# Patient Record
Sex: FEMALE | Race: WHITE | NOT HISPANIC OR LATINO | Employment: UNEMPLOYED | URBAN - METROPOLITAN AREA
[De-identification: names, ages, dates, MRNs, and addresses within clinical notes are randomized per-mention and may not be internally consistent; named-entity substitution may affect disease eponyms.]

---

## 2024-04-03 ENCOUNTER — OFFICE VISIT (OUTPATIENT)
Dept: INTERNAL MEDICINE | Facility: CLINIC | Age: 34
End: 2024-04-03
Payer: MEDICAID

## 2024-04-03 ENCOUNTER — HOSPITAL ENCOUNTER (OUTPATIENT)
Dept: GENERAL RADIOLOGY | Facility: HOSPITAL | Age: 34
Discharge: HOME | End: 2024-04-03
Admitting: NURSE PRACTITIONER
Payer: MEDICAID

## 2024-04-03 ENCOUNTER — TRANSCRIBE ORDERS (OUTPATIENT)
Dept: GENERAL RADIOLOGY | Facility: HOSPITAL | Age: 34
End: 2024-04-03
Payer: MEDICAID

## 2024-04-03 VITALS
RESPIRATION RATE: 16 BRPM | DIASTOLIC BLOOD PRESSURE: 76 MMHG | TEMPERATURE: 97.8 F | SYSTOLIC BLOOD PRESSURE: 104 MMHG | HEART RATE: 56 BPM | BODY MASS INDEX: 38.42 KG/M2 | HEIGHT: 55 IN | WEIGHT: 166 LBS

## 2024-04-03 DIAGNOSIS — K21.9 GASTROESOPHAGEAL REFLUX DISEASE, UNSPECIFIED WHETHER ESOPHAGITIS PRESENT: Primary | ICD-10-CM

## 2024-04-03 DIAGNOSIS — Q90.9 DOWN SYNDROME: ICD-10-CM

## 2024-04-03 DIAGNOSIS — F41.9 ANXIETY: ICD-10-CM

## 2024-04-03 DIAGNOSIS — Z82.0 FAMILY HISTORY OF ALZHEIMER'S DISEASE: ICD-10-CM

## 2024-04-03 DIAGNOSIS — R41.3 MEMORY CHANGE: ICD-10-CM

## 2024-04-03 DIAGNOSIS — M08.80 JIA (JUVENILE IDIOPATHIC ARTHRITIS): ICD-10-CM

## 2024-04-03 DIAGNOSIS — K22.2 ESOPHAGEAL STRICTURE: ICD-10-CM

## 2024-04-03 DIAGNOSIS — M08.80 JIA (JUVENILE IDIOPATHIC ARTHRITIS): Primary | ICD-10-CM

## 2024-04-03 DIAGNOSIS — Z78.9 HEALTH MAINTENANCE ALTERATION: ICD-10-CM

## 2024-04-03 LAB
25(OH)D3 SERPL-MCNC: 17.2 NG/ML (ref 30–100)
ALBUMIN SERPL-MCNC: 4.3 G/DL (ref 3.5–5.2)
ALBUMIN/GLOB SERPL: 1.6 G/DL
ALP SERPL-CCNC: 69 U/L (ref 39–117)
ALT SERPL W P-5'-P-CCNC: 17 U/L (ref 1–33)
ANION GAP SERPL CALCULATED.3IONS-SCNC: 10 MMOL/L (ref 5–15)
AST SERPL-CCNC: 23 U/L (ref 1–32)
BASOPHILS # BLD AUTO: 0.03 10*3/MM3 (ref 0–0.2)
BASOPHILS NFR BLD AUTO: 0.8 % (ref 0–1.5)
BILIRUB SERPL-MCNC: 0.3 MG/DL (ref 0–1.2)
BUN SERPL-MCNC: 16 MG/DL (ref 6–20)
BUN/CREAT SERPL: 13.9 (ref 7–25)
CALCIUM SPEC-SCNC: 9.2 MG/DL (ref 8.6–10.5)
CHLORIDE SERPL-SCNC: 104 MMOL/L (ref 98–107)
CHOLEST SERPL-MCNC: 152 MG/DL (ref 0–200)
CO2 SERPL-SCNC: 27 MMOL/L (ref 22–29)
CREAT SERPL-MCNC: 1.15 MG/DL (ref 0.57–1)
DEPRECATED RDW RBC AUTO: 47.1 FL (ref 37–54)
EGFRCR SERPLBLD CKD-EPI 2021: 64.2 ML/MIN/1.73
EOSINOPHIL # BLD AUTO: 0.1 10*3/MM3 (ref 0–0.4)
EOSINOPHIL NFR BLD AUTO: 2.5 % (ref 0.3–6.2)
ERYTHROCYTE [DISTWIDTH] IN BLOOD BY AUTOMATED COUNT: 13.3 % (ref 12.3–15.4)
GLOBULIN UR ELPH-MCNC: 2.7 GM/DL
GLUCOSE SERPL-MCNC: 95 MG/DL (ref 65–99)
HCT VFR BLD AUTO: 40.9 % (ref 34–46.6)
HDLC SERPL-MCNC: 44 MG/DL (ref 40–60)
HGB BLD-MCNC: 13.6 G/DL (ref 12–15.9)
IMM GRANULOCYTES # BLD AUTO: 0.04 10*3/MM3 (ref 0–0.05)
IMM GRANULOCYTES NFR BLD AUTO: 1 % (ref 0–0.5)
LDLC SERPL CALC-MCNC: 88 MG/DL (ref 0–100)
LDLC/HDLC SERPL: 1.95 {RATIO}
LYMPHOCYTES # BLD AUTO: 1.4 10*3/MM3 (ref 0.7–3.1)
LYMPHOCYTES NFR BLD AUTO: 35.4 % (ref 19.6–45.3)
MCH RBC QN AUTO: 32 PG (ref 26.6–33)
MCHC RBC AUTO-ENTMCNC: 33.3 G/DL (ref 31.5–35.7)
MCV RBC AUTO: 96.2 FL (ref 79–97)
MONOCYTES # BLD AUTO: 0.34 10*3/MM3 (ref 0.1–0.9)
MONOCYTES NFR BLD AUTO: 8.6 % (ref 5–12)
NEUTROPHILS NFR BLD AUTO: 2.05 10*3/MM3 (ref 1.7–7)
NEUTROPHILS NFR BLD AUTO: 51.7 % (ref 42.7–76)
NRBC BLD AUTO-RTO: 0 /100 WBC (ref 0–0.2)
PLATELET # BLD AUTO: 231 10*3/MM3 (ref 140–450)
PMV BLD AUTO: 11.6 FL (ref 6–12)
POTASSIUM SERPL-SCNC: 4.3 MMOL/L (ref 3.5–5.2)
PROT SERPL-MCNC: 7 G/DL (ref 6–8.5)
RBC # BLD AUTO: 4.25 10*6/MM3 (ref 3.77–5.28)
SODIUM SERPL-SCNC: 141 MMOL/L (ref 136–145)
TRIGL SERPL-MCNC: 110 MG/DL (ref 0–150)
TSH SERPL DL<=0.05 MIU/L-ACNC: 1.21 UIU/ML (ref 0.27–4.2)
VLDLC SERPL-MCNC: 20 MG/DL (ref 5–40)
WBC NRBC COR # BLD AUTO: 3.96 10*3/MM3 (ref 3.4–10.8)

## 2024-04-03 PROCEDURE — 72040 X-RAY EXAM NECK SPINE 2-3 VW: CPT

## 2024-04-03 PROCEDURE — 72070 X-RAY EXAM THORAC SPINE 2VWS: CPT

## 2024-04-03 PROCEDURE — 80061 LIPID PANEL: CPT | Performed by: PHYSICIAN ASSISTANT

## 2024-04-03 PROCEDURE — 85025 COMPLETE CBC W/AUTO DIFF WBC: CPT | Performed by: PHYSICIAN ASSISTANT

## 2024-04-03 PROCEDURE — 73130 X-RAY EXAM OF HAND: CPT

## 2024-04-03 PROCEDURE — 72100 X-RAY EXAM L-S SPINE 2/3 VWS: CPT

## 2024-04-03 PROCEDURE — 73630 X-RAY EXAM OF FOOT: CPT

## 2024-04-03 PROCEDURE — 84443 ASSAY THYROID STIM HORMONE: CPT | Performed by: PHYSICIAN ASSISTANT

## 2024-04-03 PROCEDURE — 82306 VITAMIN D 25 HYDROXY: CPT | Performed by: PHYSICIAN ASSISTANT

## 2024-04-03 PROCEDURE — 80053 COMPREHEN METABOLIC PANEL: CPT | Performed by: PHYSICIAN ASSISTANT

## 2024-04-03 RX ORDER — MEDROXYPROGESTERONE ACETATE 150 MG/ML
50 INJECTION, SUSPENSION INTRAMUSCULAR
COMMUNITY
Start: 2024-03-27

## 2024-04-03 RX ORDER — PANTOPRAZOLE SODIUM 40 MG/1
40 TABLET, DELAYED RELEASE ORAL DAILY
Qty: 30 TABLET | Refills: 3 | Status: SHIPPED | OUTPATIENT
Start: 2024-04-03

## 2024-04-03 NOTE — PROGRESS NOTES
"    Office Note     Name: Hilda Herrera    : 1990     MRN: [id number]     Chief Complaint  New Patient  and Heartburn    Subjective     History of Present Illness:  Hilda Herrera is a 33 y.o. female who presents today with her mother to establish care.    The patient has concerns regarding of heartburn and reflux. At this time she does have a significant history of esophageal strictures that she has \"stretched\" by Dr. Ovalle in the past, but has not had that done since , would like to reestablish with Dr. Ovalle to possibly have that done. She is currently taking Nexium as needed for GERD, but is interested in changing. Patient has no other concerns at this time. She has a significant past medical history of down syndrome. She does have a history of having a heart murmur and a valve replacement at 15 months old. Mother does report that patient has significant family history of Alzheimer's. Father and paternal grandmother have Alzheimer's. Mother reports lately patient seems to be more forgetful. She is unsure if this is due to anxiety and stress and she is becoming frustrated or if she is actually becoming more forgetful. They have noticed this at her workplace and her sister has noticed as well.    The patient has a significant family history of Alzheimer's, with both father and paternal grandmother having Alzheimer's.    Review of Systems:   Review of Systems    Past Medical History:   Past Medical History:   Diagnosis Date    Arthritis     Down syndrome        Past Surgical History:   Past Surgical History:   Procedure Laterality Date    CARDIAC SURGERY      valve replaced at 15 months old    FOOT SURGERY Right     FOOT SURGERY Right     FOOT SURGERY Right     TONSILLECTOMY         Immunizations:   Immunization History   Administered Date(s) Administered    COVID-19 (MODERNA) 1st,2nd,3rd Dose Monovalent 2021, 2021, 2021    Fluzone (or Fluarix & Flulaval for VFC) >6mos 2022    Tdap " "09/29/2014, 05/18/2018        Medications:     Current Outpatient Medications:     Acetaminophen-Caff-Pyrilamine 500-60-15 MG tablet, As Needed., Disp: , Rfl:     Enbrel SureClick 50 MG/ML solution auto-injector, Inject 50 mg under the skin into the appropriate area as directed Every 7 (Seven) Days., Disp: , Rfl:     pantoprazole (Protonix) 40 MG EC tablet, Take 1 tablet by mouth Daily., Disp: 30 tablet, Rfl: 3    Allergies:   Allergies   Allergen Reactions    Penicillins Rash    Cephalexin Rash and Unknown (See Comments)    Codeine Nausea And Vomiting and Unknown (See Comments)       Family History: History reviewed. No pertinent family history.    Social History:   Social History     Socioeconomic History    Marital status: Single   Tobacco Use    Smoking status: Never     Passive exposure: Never    Smokeless tobacco: Never   Vaping Use    Vaping status: Never Used   Substance and Sexual Activity    Alcohol use: Never    Drug use: Never    Sexual activity: Defer         Objective     Vital Signs  /76 (BP Location: Right arm, Patient Position: Sitting, Cuff Size: Adult)   Pulse 56   Temp 97.8 °F (36.6 °C) (Infrared)   Resp 16   Ht 139.7 cm (55\")   Wt 75.3 kg (166 lb)   BMI 38.58 kg/m²   Estimated body mass index is 38.58 kg/m² as calculated from the following:    Height as of this encounter: 139.7 cm (55\").    Weight as of this encounter: 75.3 kg (166 lb).    Class 2 Severe Obesity (BMI >=35 and <=39.9). Obesity-related health conditions include the following: none. Obesity is unchanged. BMI is is above average; BMI management plan is completed. We discussed portion control and increasing exercise.      Physical Exam  Vitals and nursing note reviewed.   Constitutional:       Appearance: Normal appearance.   Cardiovascular:      Rate and Rhythm: Normal rate and regular rhythm.      Pulses: Normal pulses.      Heart sounds: Normal heart sounds. Murmur (very mild systolic ejection murmur) heard. "   Pulmonary:      Effort: Pulmonary effort is normal.      Breath sounds: Normal breath sounds.   Skin:     General: Skin is warm and dry.   Neurological:      General: No focal deficit present.      Mental Status: She is alert.   Psychiatric:         Mood and Affect: Mood normal.         Behavior: Behavior normal.          Assessment and Plan     1. Gastroesophageal reflux disease, unspecified whether esophagitis present    - pantoprazole (Protonix) 40 MG EC tablet; Take 1 tablet by mouth Daily.  Dispense: 30 tablet; Refill: 3  - Comprehensive Metabolic Panel; Future  - CBC & Differential; Future  - Lipid Panel; Future  - Vitamin D,25-Hydroxy; Future  - Ambulatory Referral to Gastroenterology  - TSH Rfx On Abnormal To Free T4; Future  - Comprehensive Metabolic Panel  - CBC & Differential  - Lipid Panel  - Vitamin D,25-Hydroxy  - TSH Rfx On Abnormal To Free T4    2. Health maintenance alteration    - Comprehensive Metabolic Panel; Future  - CBC & Differential; Future  - Lipid Panel; Future  - Vitamin D,25-Hydroxy; Future  - TSH Rfx On Abnormal To Free T4; Future  - Comprehensive Metabolic Panel  - CBC & Differential  - Lipid Panel  - Vitamin D,25-Hydroxy  - TSH Rfx On Abnormal To Free T4    3. Down syndrome    - Comprehensive Metabolic Panel; Future  - CBC & Differential; Future  - Lipid Panel; Future  - Vitamin D,25-Hydroxy; Future  - TSH Rfx On Abnormal To Free T4; Future  - Comprehensive Metabolic Panel  - CBC & Differential  - Lipid Panel  - Vitamin D,25-Hydroxy  - TSH Rfx On Abnormal To Free T4  - Ambulatory Referral to Neurology    4. Esophageal stricture    - Ambulatory Referral to Gastroenterology    5. Anxiety      6. Family history of Alzheimer's disease    - Ambulatory Referral to Neurology    7. Memory change    - Ambulatory Referral to Neurology       Reviewed medications, potential side effects and signs and symptoms to report. Discussed risk versus benefits of treatment plan with patient and/or  family-including medications, labs and radiology that may be ordered. Addressed questions and concerns during visit. Patient and/or family verbalized understanding and agree with plan. Instructed to call the office with any questions and report to ER with any life-threatening symptoms.     Please cc results to Dianna CANO  rheum  Follow Up  No follow-ups on file.    CHELSEA AguiarE [facility]  [address]  [facility] [address] [phone]    Transcribed from ambient dictation for Jordana Steward PA-C by Michelle Harrington.  04/03/24   15:35 EDT    Patient or patient representative verbalized consent to the visit recording.  I have personally performed the services described in this document as transcribed by the above individual, and it is both accurate and complete.

## 2024-04-04 PROBLEM — R41.3 MEMORY CHANGE: Status: ACTIVE | Noted: 2024-04-04

## 2024-04-05 DIAGNOSIS — R79.89 ELEVATED SERUM CREATININE: ICD-10-CM

## 2024-04-05 DIAGNOSIS — E55.9 VITAMIN D DEFICIENCY: Primary | ICD-10-CM

## 2024-04-05 RX ORDER — ERGOCALCIFEROL 1.25 MG/1
50000 CAPSULE ORAL WEEKLY
Qty: 8 CAPSULE | Refills: 0 | Status: SHIPPED | OUTPATIENT
Start: 2024-04-05

## 2024-05-02 ENCOUNTER — PATIENT MESSAGE (OUTPATIENT)
Dept: INTERNAL MEDICINE | Facility: CLINIC | Age: 34
End: 2024-05-02
Payer: MEDICAID

## 2024-05-03 ENCOUNTER — LAB (OUTPATIENT)
Dept: INTERNAL MEDICINE | Facility: CLINIC | Age: 34
End: 2024-05-03
Payer: MEDICAID

## 2024-05-03 DIAGNOSIS — R79.89 ELEVATED SERUM CREATININE: ICD-10-CM

## 2024-05-03 LAB
ALBUMIN SERPL-MCNC: 4 G/DL (ref 3.5–5.2)
ANION GAP SERPL CALCULATED.3IONS-SCNC: 10.2 MMOL/L (ref 5–15)
BUN SERPL-MCNC: 14 MG/DL (ref 6–20)
BUN/CREAT SERPL: 17.3 (ref 7–25)
CALCIUM SPEC-SCNC: 9 MG/DL (ref 8.6–10.5)
CHLORIDE SERPL-SCNC: 105 MMOL/L (ref 98–107)
CO2 SERPL-SCNC: 25.8 MMOL/L (ref 22–29)
CREAT SERPL-MCNC: 0.81 MG/DL (ref 0.57–1)
EGFRCR SERPLBLD CKD-EPI 2021: 97.8 ML/MIN/1.73
GLUCOSE SERPL-MCNC: 80 MG/DL (ref 65–99)
PHOSPHATE SERPL-MCNC: 4.1 MG/DL (ref 2.5–4.5)
POTASSIUM SERPL-SCNC: 3.9 MMOL/L (ref 3.5–5.2)
SODIUM SERPL-SCNC: 141 MMOL/L (ref 136–145)

## 2024-05-03 PROCEDURE — 80069 RENAL FUNCTION PANEL: CPT | Performed by: PHYSICIAN ASSISTANT

## 2024-07-11 ENCOUNTER — OFFICE VISIT (OUTPATIENT)
Dept: INTERNAL MEDICINE | Facility: CLINIC | Age: 34
End: 2024-07-11
Payer: MEDICAID

## 2024-07-11 VITALS
TEMPERATURE: 98.3 F | SYSTOLIC BLOOD PRESSURE: 120 MMHG | BODY MASS INDEX: 35.41 KG/M2 | RESPIRATION RATE: 18 BRPM | DIASTOLIC BLOOD PRESSURE: 80 MMHG | WEIGHT: 153 LBS | HEIGHT: 55 IN

## 2024-07-11 DIAGNOSIS — R05.1 ACUTE COUGH: ICD-10-CM

## 2024-07-11 DIAGNOSIS — J01.40 ACUTE NON-RECURRENT PANSINUSITIS: Primary | ICD-10-CM

## 2024-07-11 LAB
EXPIRATION DATE: NORMAL
FLUAV AG UPPER RESP QL IA.RAPID: NOT DETECTED
FLUBV AG UPPER RESP QL IA.RAPID: NOT DETECTED
INTERNAL CONTROL: NORMAL
Lab: NORMAL
SARS-COV-2 AG UPPER RESP QL IA.RAPID: NOT DETECTED

## 2024-07-11 PROCEDURE — 1159F MED LIST DOCD IN RCRD: CPT | Performed by: NURSE PRACTITIONER

## 2024-07-11 PROCEDURE — 87428 SARSCOV & INF VIR A&B AG IA: CPT | Performed by: NURSE PRACTITIONER

## 2024-07-11 PROCEDURE — 1160F RVW MEDS BY RX/DR IN RCRD: CPT | Performed by: NURSE PRACTITIONER

## 2024-07-11 PROCEDURE — 99213 OFFICE O/P EST LOW 20 MIN: CPT | Performed by: NURSE PRACTITIONER

## 2024-07-11 RX ORDER — AZITHROMYCIN 250 MG/1
TABLET, FILM COATED ORAL
Qty: 6 TABLET | Refills: 0 | Status: SHIPPED | OUTPATIENT
Start: 2024-07-11

## 2024-07-11 NOTE — PROGRESS NOTES
"Patient Name: Hilda Herrera  : 1990   MRN: [id number]     Chief Complaint:    Chief Complaint   Patient presents with    Cough    Earache       History of Present Illness: Hilda Herrera is a 33 y.o. female.  History of Present Illness  The patient presents for evaluation of multiple medical concerns. She is accompanied by her mother.    The patient reports experiencing fluid accumulation in her right ear, accompanied by a persistent cough and significant drainage. She denies the presence of fever, shortness of breath, or sore throat. She also denies any facial pain, nausea, vomiting, or diarrhea.         Subjective     Review of System: Review of Systems     Medications:     Current Outpatient Medications:     Acetaminophen-Caff-Pyrilamine 500-60-15 MG tablet, As Needed., Disp: , Rfl:     Enbrel SureClick 50 MG/ML solution auto-injector, Inject 50 mg under the skin into the appropriate area as directed Every 7 (Seven) Days., Disp: , Rfl:     pantoprazole (Protonix) 40 MG EC tablet, Take 1 tablet by mouth Daily., Disp: 30 tablet, Rfl: 3    azithromycin (Zithromax Z-Joshua) 250 MG tablet, Take 2 tablets by mouth on day 1, then 1 tablet daily on days 2-5, Disp: 6 tablet, Rfl: 0    vitamin D (ERGOCALCIFEROL) 1.25 MG (98126 UT) capsule capsule, Take 1 capsule by mouth 1 (One) Time Per Week., Disp: 8 capsule, Rfl: 0    Allergies:   Allergies   Allergen Reactions    Penicillins Rash    Cephalexin Rash and Unknown (See Comments)    Codeine Nausea And Vomiting and Unknown (See Comments)       Objective     Physical Exam:   Vital Signs:   Vitals:    24 1109   BP: 120/80   Resp: 18   Temp: 98.3 °F (36.8 °C)   Weight: 69.4 kg (153 lb)   Height: 139.7 cm (55\")     Body mass index is 35.56 kg/m².        Physical Exam  Physical Exam  Patient appears well.  Pharynx is slightly erythematous with postnasal drainage. TMs are normal.  No cervical adenopathy.  Lungs are clear.  Heart rate is regular.  Abdomen is soft, nontender. " Active bowel sounds.  Gait is normal.       Results  Laboratory Studies  Covid test was negative.     Assessment / Plan      Assessment/Plan:   Diagnoses and all orders for this visit:    1. Acute non-recurrent pansinusitis (Primary)  -     azithromycin (Zithromax Z-Joshua) 250 MG tablet; Take 2 tablets by mouth on day 1, then 1 tablet daily on days 2-5  Dispense: 6 tablet; Refill: 0    2. Acute cough  -     POCT SARS-CoV-2 + Flu Antigen FLAKITA; Future  -     POCT SARS-CoV-2 + Flu Antigen FLAKITA       Assessment & Plan    The patient was informed about the common side effects and recommendations for probiotics. She was advised to complete the 10-day course of antibiotics, and to continue with supportive care and hydration.    Follow-up  The patient is scheduled for a follow-up if there is no improvement in her symptoms or if her condition worsens.         Patient or patient representative verbalized consent for the use of Ambient Listening during the visit with  RACHAEL Piper for chart documentation. 7/11/2024  11:29 EDT    RACHAEL Piper  [facility]

## 2024-07-24 DIAGNOSIS — K21.9 GASTROESOPHAGEAL REFLUX DISEASE, UNSPECIFIED WHETHER ESOPHAGITIS PRESENT: ICD-10-CM

## 2024-07-24 RX ORDER — PANTOPRAZOLE SODIUM 40 MG/1
40 TABLET, DELAYED RELEASE ORAL DAILY
Qty: 30 TABLET | Refills: 0 | Status: SHIPPED | OUTPATIENT
Start: 2024-07-24

## 2024-07-24 NOTE — TELEPHONE ENCOUNTER
LOV 04/03/2024  NOV Not scheduled yet    Please cc results to Dianna CANO  rheum  Follow Up  No follow-ups on file.

## 2024-07-30 DIAGNOSIS — R63.4 WEIGHT LOSS: Primary | ICD-10-CM

## 2024-08-23 DIAGNOSIS — K21.9 GASTROESOPHAGEAL REFLUX DISEASE, UNSPECIFIED WHETHER ESOPHAGITIS PRESENT: ICD-10-CM

## 2024-08-23 RX ORDER — PANTOPRAZOLE SODIUM 40 MG/1
40 TABLET, DELAYED RELEASE ORAL DAILY
Qty: 30 TABLET | Refills: 0 | Status: SHIPPED | OUTPATIENT
Start: 2024-08-23

## 2024-09-04 ENCOUNTER — TELEPHONE (OUTPATIENT)
Dept: INTERNAL MEDICINE | Facility: CLINIC | Age: 34
End: 2024-09-04
Payer: MEDICAID

## 2024-09-04 NOTE — TELEPHONE ENCOUNTER
Spoke with mother re: standing lab orders. Mother stats labs were completed at  and are resulted in chart. Please advise      Mother additionally states will complete stool sample next week

## 2024-09-06 NOTE — TELEPHONE ENCOUNTER
Pt notified of message from ALEXX Aguiar   Most labs are within normal limits.   No anemia noted.     Thyroid labs were within normal limits     Glucose was slightly elevated but may have been if she wasn't fasting      Good understanding verbalized.

## 2024-09-06 NOTE — TELEPHONE ENCOUNTER
Most labs are within normal limits.   No anemia noted.    Thyroid labs were within normal limits    Glucose was slightly elevated but may have been if she wasn't fasting

## 2024-09-18 ENCOUNTER — OFFICE VISIT (OUTPATIENT)
Dept: INTERNAL MEDICINE | Facility: CLINIC | Age: 34
End: 2024-09-18
Payer: MEDICAID

## 2024-09-18 ENCOUNTER — OFFICE VISIT (OUTPATIENT)
Dept: NEUROLOGY | Facility: CLINIC | Age: 34
End: 2024-09-18
Payer: MEDICAID

## 2024-09-18 VITALS
BODY MASS INDEX: 34.66 KG/M2 | OXYGEN SATURATION: 99 % | HEART RATE: 83 BPM | SYSTOLIC BLOOD PRESSURE: 110 MMHG | WEIGHT: 149.13 LBS | DIASTOLIC BLOOD PRESSURE: 70 MMHG | RESPIRATION RATE: 20 BRPM | TEMPERATURE: 97.8 F

## 2024-09-18 VITALS
DIASTOLIC BLOOD PRESSURE: 70 MMHG | WEIGHT: 149 LBS | HEART RATE: 64 BPM | OXYGEN SATURATION: 100 % | SYSTOLIC BLOOD PRESSURE: 106 MMHG | BODY MASS INDEX: 34.48 KG/M2 | HEIGHT: 55 IN

## 2024-09-18 DIAGNOSIS — R41.3 MEMORY CHANGE: ICD-10-CM

## 2024-09-18 DIAGNOSIS — F41.9 ANXIETY: Primary | ICD-10-CM

## 2024-09-18 DIAGNOSIS — F51.05 INSOMNIA DUE TO OTHER MENTAL DISORDER: Primary | ICD-10-CM

## 2024-09-18 DIAGNOSIS — F99 INSOMNIA DUE TO OTHER MENTAL DISORDER: Primary | ICD-10-CM

## 2024-09-18 DIAGNOSIS — Q90.9 DOWN SYNDROME: ICD-10-CM

## 2024-09-18 DIAGNOSIS — Z82.0 FAMILY HISTORY OF ALZHEIMER'S DISEASE: ICD-10-CM

## 2024-09-18 PROCEDURE — 99214 OFFICE O/P EST MOD 30 MIN: CPT | Performed by: PHYSICIAN ASSISTANT

## 2024-09-18 PROCEDURE — 1160F RVW MEDS BY RX/DR IN RCRD: CPT | Performed by: NURSE PRACTITIONER

## 2024-09-18 PROCEDURE — 1159F MED LIST DOCD IN RCRD: CPT | Performed by: NURSE PRACTITIONER

## 2024-09-18 PROCEDURE — 99214 OFFICE O/P EST MOD 30 MIN: CPT | Performed by: NURSE PRACTITIONER

## 2024-09-18 PROCEDURE — 1125F AMNT PAIN NOTED PAIN PRSNT: CPT | Performed by: PHYSICIAN ASSISTANT

## 2024-09-18 RX ORDER — HYDROXYZINE HYDROCHLORIDE 25 MG/1
25 TABLET, FILM COATED ORAL NIGHTLY PRN
Qty: 30 TABLET | Refills: 3 | Status: SHIPPED | OUTPATIENT
Start: 2024-09-18

## 2024-09-18 RX ORDER — ESCITALOPRAM OXALATE 10 MG/1
10 TABLET ORAL DAILY
Qty: 30 TABLET | Refills: 3 | Status: SHIPPED | OUTPATIENT
Start: 2024-09-18 | End: 2025-09-18

## 2024-09-22 DIAGNOSIS — K21.9 GASTROESOPHAGEAL REFLUX DISEASE, UNSPECIFIED WHETHER ESOPHAGITIS PRESENT: ICD-10-CM

## 2024-09-23 RX ORDER — PANTOPRAZOLE SODIUM 40 MG/1
40 TABLET, DELAYED RELEASE ORAL DAILY
Qty: 30 TABLET | Refills: 0 | Status: SHIPPED | OUTPATIENT
Start: 2024-09-23

## 2024-10-21 ENCOUNTER — TELEPHONE (OUTPATIENT)
Dept: NEUROLOGY | Facility: CLINIC | Age: 34
End: 2024-10-21
Payer: MEDICAID

## 2024-10-21 NOTE — TELEPHONE ENCOUNTER
Called Hilda's mother Marycruz to discuss results of the neuropsych eval.  The phone went straight to voicemail and I left a message for her to return the call to the office at her convenience.

## 2024-11-04 RX ORDER — DONEPEZIL HYDROCHLORIDE 5 MG/1
5 TABLET, FILM COATED ORAL NIGHTLY
Qty: 30 TABLET | Refills: 6 | Status: SHIPPED | OUTPATIENT
Start: 2024-11-04 | End: 2025-11-04

## 2024-11-05 ENCOUNTER — TELEPHONE (OUTPATIENT)
Dept: NEUROLOGY | Facility: CLINIC | Age: 34
End: 2024-11-05
Payer: MEDICAID

## 2024-11-05 DIAGNOSIS — R41.3 MEMORY CHANGE: Primary | ICD-10-CM

## 2024-11-05 DIAGNOSIS — Q90.9 DOWN SYNDROME: ICD-10-CM

## 2024-11-05 DIAGNOSIS — Z82.0 FAMILY HISTORY OF ALZHEIMER'S DISEASE: ICD-10-CM

## 2024-11-05 NOTE — TELEPHONE ENCOUNTER
Returned call to Hilda's mom, Marycruz to answer questions regarding neuropsych eval.    Marycruz asks if there are any specific symptoms that she needs to be watching for and we discussed that she will continue to monitor for neurologic symptoms such as forgetfulness, agitation.    Hilda has an appointment with a new PCP and Marycruz was curious as to whether any other blood work needs to be performed at that visit.  In looking back she had thyroid studies done long with a CMP and CBC and August and those were all normal.  She does not need any further lab studies at this time.    We discussed MRI of the brain I do not feel it would be a good idea to go ahead and have her do the scan to get a baseline.  Marycruz is in agreement with this.    I encouraged Marycruz to write down any further questions and we can discuss more at her follow-up visit next week.

## 2024-11-06 ENCOUNTER — OFFICE VISIT (OUTPATIENT)
Dept: INTERNAL MEDICINE | Facility: CLINIC | Age: 34
End: 2024-11-06
Payer: MEDICAID

## 2024-11-06 VITALS
HEART RATE: 74 BPM | DIASTOLIC BLOOD PRESSURE: 74 MMHG | RESPIRATION RATE: 18 BRPM | SYSTOLIC BLOOD PRESSURE: 112 MMHG | WEIGHT: 152 LBS | BODY MASS INDEX: 35.33 KG/M2 | TEMPERATURE: 97.8 F

## 2024-11-06 DIAGNOSIS — Q74.2 CONGENITAL FOOT ABNORMALITY: ICD-10-CM

## 2024-11-06 DIAGNOSIS — Q90.9 DOWN SYNDROME: ICD-10-CM

## 2024-11-06 DIAGNOSIS — K22.2 ESOPHAGEAL STRICTURE: ICD-10-CM

## 2024-11-06 DIAGNOSIS — N93.9 ABNORMAL UTERINE BLEEDING: ICD-10-CM

## 2024-11-06 DIAGNOSIS — F41.9 ANXIETY: ICD-10-CM

## 2024-11-06 DIAGNOSIS — Z00.00 HEALTHCARE MAINTENANCE: Primary | ICD-10-CM

## 2024-11-06 DIAGNOSIS — R41.3 MEMORY LOSS: ICD-10-CM

## 2024-11-06 PROBLEM — Z82.0 FAMILY HISTORY OF ALZHEIMER'S DISEASE: Status: RESOLVED | Noted: 2024-04-03 | Resolved: 2024-11-06

## 2024-11-06 PROBLEM — Z71.1 CONCERN ABOUT MEMORY: Status: ACTIVE | Noted: 2024-04-04

## 2024-11-06 LAB
ALBUMIN SERPL-MCNC: 3.9 G/DL (ref 3.5–5.2)
ALBUMIN/GLOB SERPL: 1.5 G/DL
ALP SERPL-CCNC: 61 U/L (ref 39–117)
ALT SERPL W P-5'-P-CCNC: 11 U/L (ref 1–33)
ANION GAP SERPL CALCULATED.3IONS-SCNC: 9.9 MMOL/L (ref 5–15)
AST SERPL-CCNC: 26 U/L (ref 1–32)
BILIRUB SERPL-MCNC: <0.2 MG/DL (ref 0–1.2)
BUN SERPL-MCNC: 13 MG/DL (ref 6–20)
BUN/CREAT SERPL: 14.3 (ref 7–25)
CALCIUM SPEC-SCNC: 8.5 MG/DL (ref 8.6–10.5)
CHLORIDE SERPL-SCNC: 105 MMOL/L (ref 98–107)
CHOLEST SERPL-MCNC: 156 MG/DL (ref 0–200)
CO2 SERPL-SCNC: 26.1 MMOL/L (ref 22–29)
CREAT SERPL-MCNC: 0.91 MG/DL (ref 0.57–1)
EGFRCR SERPLBLD CKD-EPI 2021: 85.1 ML/MIN/1.73
EXPIRATION DATE: NORMAL
FSH SERPL-ACNC: 9.26 MIU/ML
GLOBULIN UR ELPH-MCNC: 2.6 GM/DL
GLUCOSE SERPL-MCNC: 77 MG/DL (ref 65–99)
HBA1C MFR BLD: 5.2 % (ref 4.5–5.7)
HDLC SERPL-MCNC: 47 MG/DL (ref 40–60)
LDLC SERPL CALC-MCNC: 95 MG/DL (ref 0–100)
LDLC/HDLC SERPL: 2.02 {RATIO}
LH SERPL-ACNC: 25.2 MIU/ML
Lab: NORMAL
POTASSIUM SERPL-SCNC: 4 MMOL/L (ref 3.5–5.2)
PROLACTIN SERPL-MCNC: 15.2 NG/ML (ref 4.79–23.3)
PROT SERPL-MCNC: 6.5 G/DL (ref 6–8.5)
SODIUM SERPL-SCNC: 141 MMOL/L (ref 136–145)
TRIGL SERPL-MCNC: 71 MG/DL (ref 0–150)
TSH SERPL DL<=0.05 MIU/L-ACNC: 0.98 UIU/ML (ref 0.27–4.2)
VLDLC SERPL-MCNC: 14 MG/DL (ref 5–40)

## 2024-11-06 PROCEDURE — 82626 DEHYDROEPIANDROSTERONE: CPT | Performed by: STUDENT IN AN ORGANIZED HEALTH CARE EDUCATION/TRAINING PROGRAM

## 2024-11-06 PROCEDURE — 84146 ASSAY OF PROLACTIN: CPT | Performed by: STUDENT IN AN ORGANIZED HEALTH CARE EDUCATION/TRAINING PROGRAM

## 2024-11-06 PROCEDURE — 84402 ASSAY OF FREE TESTOSTERONE: CPT | Performed by: STUDENT IN AN ORGANIZED HEALTH CARE EDUCATION/TRAINING PROGRAM

## 2024-11-06 PROCEDURE — 83002 ASSAY OF GONADOTROPIN (LH): CPT | Performed by: STUDENT IN AN ORGANIZED HEALTH CARE EDUCATION/TRAINING PROGRAM

## 2024-11-06 PROCEDURE — 83525 ASSAY OF INSULIN: CPT | Performed by: STUDENT IN AN ORGANIZED HEALTH CARE EDUCATION/TRAINING PROGRAM

## 2024-11-06 PROCEDURE — 84443 ASSAY THYROID STIM HORMONE: CPT | Performed by: STUDENT IN AN ORGANIZED HEALTH CARE EDUCATION/TRAINING PROGRAM

## 2024-11-06 PROCEDURE — 82397 CHEMILUMINESCENT ASSAY: CPT | Performed by: STUDENT IN AN ORGANIZED HEALTH CARE EDUCATION/TRAINING PROGRAM

## 2024-11-06 PROCEDURE — 82627 DEHYDROEPIANDROSTERONE: CPT | Performed by: STUDENT IN AN ORGANIZED HEALTH CARE EDUCATION/TRAINING PROGRAM

## 2024-11-06 PROCEDURE — 83498 ASY HYDROXYPROGESTERONE 17-D: CPT | Performed by: STUDENT IN AN ORGANIZED HEALTH CARE EDUCATION/TRAINING PROGRAM

## 2024-11-06 PROCEDURE — 85025 COMPLETE CBC W/AUTO DIFF WBC: CPT | Performed by: STUDENT IN AN ORGANIZED HEALTH CARE EDUCATION/TRAINING PROGRAM

## 2024-11-06 PROCEDURE — 82672 ASSAY OF ESTROGEN: CPT | Performed by: STUDENT IN AN ORGANIZED HEALTH CARE EDUCATION/TRAINING PROGRAM

## 2024-11-06 PROCEDURE — 80061 LIPID PANEL: CPT | Performed by: STUDENT IN AN ORGANIZED HEALTH CARE EDUCATION/TRAINING PROGRAM

## 2024-11-06 PROCEDURE — 80053 COMPREHEN METABOLIC PANEL: CPT | Performed by: STUDENT IN AN ORGANIZED HEALTH CARE EDUCATION/TRAINING PROGRAM

## 2024-11-06 PROCEDURE — 83001 ASSAY OF GONADOTROPIN (FSH): CPT | Performed by: STUDENT IN AN ORGANIZED HEALTH CARE EDUCATION/TRAINING PROGRAM

## 2024-11-06 NOTE — PATIENT INSTRUCTIONS
Health Maintenance, Female  Adopting a healthy lifestyle and getting preventive care can go a long way to promote health and wellness. Talk with your health care provider about what schedule of regular examinations is right for you. This is a good chance for you to check in with your provider about disease prevention and staying healthy.  In between checkups, there are plenty of things you can do on your own. Experts have done a lot of research about which lifestyle changes and preventive measures are most likely to keep you healthy. Ask your health care provider for more information.  Weight and diet  Eat a healthy diet  Be sure to include plenty of vegetables, fruits, low-fat dairy products, and lean protein.  Do not eat a lot of foods high in solid fats, added sugars, or salt.  Get regular exercise. This is one of the most important things you can do for your health.  Most adults should exercise for at least 150 minutes each week. The exercise should increase your heart rate and make you sweat (moderate-intensity exercise).  Most adults should also do strengthening exercises at least twice a week. This is in addition to the moderate-intensity exercise.     Maintain a healthy weight  Body mass index (BMI) is a measurement that can be used to identify possible weight problems. It estimates body fat based on height and weight. Your health care provider can help determine your BMI and help you achieve or maintain a healthy weight.  For females 20 years of age and older:  A BMI below 18.5 is considered underweight.  A BMI of 18.5 to 24.9 is normal.  A BMI of 25 to 29.9 is considered overweight.  A BMI of 30 and above is considered obese.     Watch levels of cholesterol and blood lipids  You should start having your blood tested for lipids and cholesterol at 20 years of age, then have this test every 5 years.  You may need to have your cholesterol levels checked more often if:  Your lipid or cholesterol levels are  high.  You are older than 50 years of age.  You are at high risk for heart disease.     Cancer screening  Lung Cancer  Lung cancer screening is recommended for adults 55-80 years old who are at high risk for lung cancer because of a history of smoking.  A yearly low-dose CT scan of the lungs is recommended for people who:  Currently smoke.  Have quit within the past 15 years.  Have at least a 30-pack-year history of smoking. A pack year is smoking an average of one pack of cigarettes a day for 1 year.  Yearly screening should continue until it has been 15 years since you quit.  Yearly screening should stop if you develop a health problem that would prevent you from having lung cancer treatment.     Breast Cancer  Practice breast self-awareness. This means understanding how your breasts normally appear and feel.  It also means doing regular breast self-exams. Let your health care provider know about any changes, no matter how small.  If you are in your 20s or 30s, you should have a clinical breast exam (CBE) by a health care provider every 1-3 years as part of a regular health exam.  If you are 40 or older, have a CBE every year. Also consider having a breast X-ray (mammogram) every year.  If you have a family history of breast cancer, talk to your health care provider about genetic screening.  If you are at high risk for breast cancer, talk to your health care provider about having an MRI and a mammogram every year.  Breast cancer gene (BRCA) assessment is recommended for women who have family members with BRCA-related cancers. BRCA-related cancers include:  Breast.  Ovarian.  Tubal.  Peritoneal cancers.  Results of the assessment will determine the need for genetic counseling and BRCA1 and BRCA2 testing.     Cervical Cancer  Your health care provider may recommend that you be screened regularly for cancer of the pelvic organs (ovaries, uterus, and vagina). This screening involves a pelvic examination, including  checking for microscopic changes to the surface of your cervix (Pap test). You may be encouraged to have this screening done every 3 years, beginning at age 21.  For women ages 30-65, health care providers may recommend pelvic exams and Pap testing every 3 years, or they may recommend the Pap and pelvic exam, combined with testing for human papilloma virus (HPV), every 5 years. Some types of HPV increase your risk of cervical cancer. Testing for HPV may also be done on women of any age with unclear Pap test results.  Other health care providers may not recommend any screening for nonpregnant women who are considered low risk for pelvic cancer and who do not have symptoms. Ask your health care provider if a screening pelvic exam is right for you.  If you have had past treatment for cervical cancer or a condition that could lead to cancer, you need Pap tests and screening for cancer for at least 20 years after your treatment. If Pap tests have been discontinued, your risk factors (such as having a new sexual partner) need to be reassessed to determine if screening should resume. Some women have medical problems that increase the chance of getting cervical cancer. In these cases, your health care provider may recommend more frequent screening and Pap tests.     Colorectal Cancer  This type of cancer can be detected and often prevented.  Routine colorectal cancer screening usually begins at 50 years of age and continues through 75 years of age.  Your health care provider may recommend screening at an earlier age if you have risk factors for colon cancer.  Your health care provider may also recommend using home test kits to check for hidden blood in the stool.  A small camera at the end of a tube can be used to examine your colon directly (sigmoidoscopy or colonoscopy). This is done to check for the earliest forms of colorectal cancer.  Routine screening usually begins at age 50.  Direct examination of the colon should  be repeated every 5-10 years through 75 years of age. However, you may need to be screened more often if early forms of precancerous polyps or small growths are found.     Skin Cancer  Check your skin from head to toe regularly.  Tell your health care provider about any new moles or changes in moles, especially if there is a change in a mole's shape or color.  Also tell your health care provider if you have a mole that is larger than the size of a pencil eraser.  Always use sunscreen. Apply sunscreen liberally and repeatedly throughout the day.  Protect yourself by wearing long sleeves, pants, a wide-brimmed hat, and sunglasses whenever you are outside.     Heart disease, diabetes, and high blood pressure  High blood pressure causes heart disease and increases the risk of stroke. High blood pressure is more likely to develop in:  People who have blood pressure in the high end of the normal range (130-139/85-89 mm Hg).  People who are overweight or obese.  People who are .  If you are 18-39 years of age, have your blood pressure checked every 3-5 years. If you are 40 years of age or older, have your blood pressure checked every year. You should have your blood pressure measured twice--once when you are at a hospital or clinic, and once when you are not at a hospital or clinic. Record the average of the two measurements. To check your blood pressure when you are not at a hospital or clinic, you can use:  An automated blood pressure machine at a pharmacy.  A home blood pressure monitor.  If you are between 55 years and 79 years old, ask your health care provider if you should take aspirin to prevent strokes.  Have regular diabetes screenings. This involves taking a blood sample to check your fasting blood sugar level.  If you are at a normal weight and have a low risk for diabetes, have this test once every three years after 45 years of age.  If you are overweight and have a high risk for diabetes,  consider being tested at a younger age or more often.  Preventing infection  Hepatitis B  If you have a higher risk for hepatitis B, you should be screened for this virus. You are considered at high risk for hepatitis B if:  You were born in a country where hepatitis B is common. Ask your health care provider which countries are considered high risk.  Your parents were born in a high-risk country, and you have not been immunized against hepatitis B (hepatitis B vaccine).  You have HIV or AIDS.  You use needles to inject street drugs.  You live with someone who has hepatitis B.  You have had sex with someone who has hepatitis B.  You get hemodialysis treatment.  You take certain medicines for conditions, including cancer, organ transplantation, and autoimmune conditions.     Hepatitis C  Blood testing is recommended for:  Everyone born from 1945 through 1965.  Anyone with known risk factors for hepatitis C.     Sexually transmitted infections (STIs)  You should be screened for sexually transmitted infections (STIs) including gonorrhea and chlamydia if:  You are sexually active and are younger than 24 years of age.  You are older than 24 years of age and your health care provider tells you that you are at risk for this type of infection.  Your sexual activity has changed since you were last screened and you are at an increased risk for chlamydia or gonorrhea. Ask your health care provider if you are at risk.  If you do not have HIV, but are at risk, it may be recommended that you take a prescription medicine daily to prevent HIV infection. This is called pre-exposure prophylaxis (PrEP). You are considered at risk if:  You are sexually active and do not regularly use condoms or know the HIV status of your partner(s).  You take drugs by injection.  You are sexually active with a partner who has HIV.     Talk with your health care provider about whether you are at high risk of being infected with HIV. If you choose to  begin PrEP, you should first be tested for HIV. You should then be tested every 3 months for as long as you are taking PrEP.  Pregnancy  If you are premenopausal and you may become pregnant, ask your health care provider about preconception counseling.  If you may become pregnant, take 400 to 800 micrograms (mcg) of folic acid every day.  If you want to prevent pregnancy, talk to your health care provider about birth control (contraception).  Osteoporosis and menopause  Osteoporosis is a disease in which the bones lose minerals and strength with aging. This can result in serious bone fractures. Your risk for osteoporosis can be identified using a bone density scan.  If you are 65 years of age or older, or if you are at risk for osteoporosis and fractures, ask your health care provider if you should be screened.  Ask your health care provider whether you should take a calcium or vitamin D supplement to lower your risk for osteoporosis.  Menopause may have certain physical symptoms and risks.  Hormone replacement therapy may reduce some of these symptoms and risks.  Talk to your health care provider about whether hormone replacement therapy is right for you.  Follow these instructions at home:  Schedule regular health, dental, and eye exams.  Stay current with your immunizations.  Do not use any tobacco products including cigarettes, chewing tobacco, or electronic cigarettes.  If you are pregnant, do not drink alcohol.  If you are breastfeeding, limit how much and how often you drink alcohol.  Limit alcohol intake to no more than 1 drink per day for nonpregnant women. One drink equals 12 ounces of beer, 5 ounces of wine, or 1½ ounces of hard liquor.  Do not use street drugs.  Do not share needles.  Ask your health care provider for help if you need support or information about quitting drugs.  Tell your health care provider if you often feel depressed.  Tell your health care provider if you have ever been abused or do  not feel safe at home.  This information is not intended to replace advice given to you by your health care provider. Make sure you discuss any questions you have with your health care provider.  Document Released: 07/02/2012 Document Revised: 05/25/2017 Document Reviewed: 09/20/2016  [employer] Interactive Patient Education © 2018 [employer]         Healthy Delicious Recipes from [employer]: https://Shodogg.[employer]/  [place] Plant Based Meal Recipes: https://Provasculon/  Heart Healthy Recipes from [employer]: https://abcardio.org/wp-content/uploads/2020/06/ABC_Cookbook.pdf  [place] Healthy Living Guide: https://www.\[facility]\""h.[employer].edu/nutritionsource/2022/01/06/healthy-living-guide-5778-9110/  SNAP Cookbook for Budgets: http://ongoMobile Event Guide.net/dss/documents/good-and-cheap.pdf

## 2024-11-06 NOTE — PROGRESS NOTES
Female Physical Note      Date: 2024   Patient Name: Hilda Herrera  : 1990   MRN: [id number]     Chief Complaint:    Chief Complaint   Patient presents with   • Establish Care       History of Present Illness: Hilda Herrera is a 33 y.o. female who is here today for their annual health maintenance and physical.  Her mother is an independent historian.  History of Present Illness  The patient presents for evaluation of multiple medical concerns. She is accompanied by her mother.    She reports difficulty sleeping at night, often waking up due to panic attacks. These episodes have been more frequent since starting Lexapro. Despite this, she believes the medication has improved her sleep quality. She also experiences separation anxiety and uses a night light to aid her sleep. Teeth grinding is another issue she faces. She has been cleared by a cardiologist and has no issues with needles. She is scheduled to start donepezil on Friday.    She has a history of esophageal stricture, which requires stretching every two years. The last procedure was performed this year. She occasionally struggles with swallowing and sometimes chokes on water. She has not undergone occupational therapy.    She has not had a Pap smear and has not menstruated in four months. She has a dentist but had to cancel her last appointment. She has noticed an improvement in her bite but still experiences difficulty eating.    She has undergone three surgeries on her foot, which now contains hardware and is curved.    FAMILY HISTORY  She has a family history of Alzheimer's.         Subjective      Review of Systems:   Review of Systems   All other systems reviewed and are negative.      Past Medical History, Social History, Family History and Care Team were all reviewed with patient and updated as appropriate.     Medications:     Current Outpatient Medications:   •  Acetaminophen-Caff-Pyrilamine 500-60-15 MG tablet, As Needed., Disp: , Rfl:    •  donepezil (Aricept) 5 MG tablet, Take 1 tablet by mouth Every Night., Disp: 30 tablet, Rfl: 6  •  Enbrel SureClick 50 MG/ML solution auto-injector, Inject 50 mg under the skin into the appropriate area as directed Every 7 (Seven) Days., Disp: , Rfl:   •  escitalopram (Lexapro) 10 MG tablet, Take 1 tablet by mouth Daily., Disp: 30 tablet, Rfl: 3  •  hydrOXYzine (ATARAX) 25 MG tablet, Take 1 tablet by mouth At Night As Needed for Anxiety (insomnia)., Disp: 30 tablet, Rfl: 3  •  pantoprazole (PROTONIX) 40 MG EC tablet, Take 1 tablet by mouth once daily, Disp: 30 tablet, Rfl: 0    Allergies:   Allergies   Allergen Reactions   • Penicillins Rash   • Cephalexin Rash and Unknown (See Comments)   • Codeine Nausea And Vomiting and Unknown (See Comments)       Immunizations:  Immunization History   Administered Date(s) Administered   • COVID-19 (MODERNA) 1st,2nd,3rd Dose Monovalent 01/13/2021, 02/09/2021   • COVID-19 (MODERNA) Monovalent Original Booster 12/07/2021   • Fluzone (or Fluarix & Flulaval for VFC) >6mos 12/13/2022   • Tdap 09/29/2014, 05/18/2018       Colorectal Screening:   Up-To-Date   Last Completed Colonoscopy       This patient has no relevant Health Maintenance data.          Pap:  Up-To-Date   Last Completed Pap Smear            Discontinued - PAP SMEAR  Discontinued      No completion history exists for this topic.                   Mammogram:  Up-To-Date   Last Completed Mammogram       This patient has no relevant Health Maintenance data.             CT for Smoker (Age 50-80, 20 pk yr):  N/A  Bone Density/DEXA (Age 65 or high risk): DEXA scan to be completed at age 65  Hep C (Age 18-79 once):  previously negative  HIV (Age 15-65 once): previously negative  A1c: ordered  Lipid panel: ordered    Dermatology: pending  Ophthalmologist: established  Dentist: established    Tobacco Use: Low Risk  (11/6/2024)    Patient History    • Smoking Tobacco Use: Never    • Smokeless Tobacco Use: Never    • Passive  Exposure: Never       Social History     Substance and Sexual Activity   Alcohol Use Never        Social History     Substance and Sexual Activity   Drug Use Never        Diet/Physical activity: counseled on 11/06/24    PHQ-2 Depression Screening  Little interest or pleasure in doing things? Not at all   Feeling down, depressed, or hopeless? Not at all   PHQ-2 Total Score 0       Intimate partner violence: (Screen on initial visit, pregnant women, women with injuries, older adult with injury or evidence of neglect): no concerns  Violence can be a problem in many people's lives, so I now ask every patient about trauma or abuse they may have experienced in a relationship.  Stress/Safety - Do you feel safe in your relationship?  Afraid/Abused - Have you ever been in a relationship where you were threatened, hurt, or afraid?  Friend/Family - Are your friends aware you have been hurt?  Emergency Plan - Do you have a safe place to go and the resources you need in an emergency?    Osteoporosis: no concerns  Ost menopausal women < 65 with RF (advancing age, previous fracture, glucocorticoid therapy, parental hip fracture, low body weight, current cigarette smoking, excessive alcohol consumption, rheumatoid arthritis, secondary osteoporosis [hypogonadism/premature menopause, malabsorption, chronic liver disease, IBD]).  All women 65 or older    Objective     Physical Exam:  Vital Signs:   Vitals:    11/06/24 1341   BP: 112/74   BP Location: Left arm   Patient Position: Sitting   Cuff Size: Adult   Pulse: 74   Resp: 18   Temp: 97.8 °F (36.6 °C)   TempSrc: Temporal   Weight: 68.9 kg (152 lb)   PainSc: 0-No pain     Body mass index is 35.33 kg/m².     Physical Exam  Vitals and nursing note reviewed.   Constitutional:       General: She is not in acute distress.     Appearance: She is normal weight. She is not ill-appearing or toxic-appearing.   HENT:      Nose: No congestion or rhinorrhea.   Eyes:      General:         Right  eye: No discharge.         Left eye: No discharge.      Conjunctiva/sclera: Conjunctivae normal.   Cardiovascular:      Heart sounds: Murmur heard.   Pulmonary:      Effort: Pulmonary effort is normal. No respiratory distress.   Abdominal:      General: Abdomen is flat. There is no distension.   Musculoskeletal:      Cervical back: Normal range of motion.   Skin:     Coloration: Skin is not jaundiced.      Findings: No rash.   Neurological:      General: No focal deficit present.      Mental Status: She is alert. Mental status is at baseline.      Coordination: Coordination normal.      Gait: Gait normal.   Psychiatric:         Mood and Affect: Mood normal.         Behavior: Behavior normal.         Thought Content: Thought content normal.         Judgment: Judgment normal.         Procedures    Assessment / Plan      Assessment/Plan:   Problem List Items Addressed This Visit       Down syndrome    Relevant Orders    POC Glycosylated Hemoglobin (Hb A1C) (Completed)    Ambulatory Referral to Speech Therapy for Evaluation & Treatment    Ambulatory Referral to Occupational Therapy for Evaluation & Treatment    TSH    T4    Esophageal stricture    Overview     Due for dilation every 2 years  - next due in 2026         Relevant Orders    Ambulatory Referral to Speech Therapy for Evaluation & Treatment    Anxiety    Memory loss    Abnormal uterine bleeding    Relevant Orders    FSH & LH    Prolactin    TSH    CBC Auto Differential    Antimullerian Hormone (AMH)    DHEA    DHEA-sulfate    17-Hydroxyprogesterone    Testosterone Free Direct    Estrogens, Total    Insulin, Total    Congenital foot abnormality    Relevant Orders    Ambulatory Referral to Prosthetist     Other Visit Diagnoses       Healthcare maintenance    -  Primary    Relevant Orders    Comprehensive Metabolic Panel    Lipid Panel    POC Glycosylated Hemoglobin (Hb A1C) (Completed)          The ASCVD Risk score (Marisa ODEN, et al., 2019) failed to calculate  for the following reasons:    The 2019 ASCVD risk score is only valid for ages 40 to 79  Assessment & Plan  1. Anxiety.  Her symptoms of anxiety have shown improvement with the current dosage of Lexapro. She reports some diarrhea as a side effect. The dosage of Lexapro will be maintained at 10 mg. Vistaril at a higher dose was discussed as an option to help with acute anxiety symptoms. BuSpar was also mentioned as a potential augmentation for daytime anxiety. She will continue using a night light to help with sleep.    2. Separation Anxiety.  She still experiences some separation anxiety, but it has lessened. The current treatment plan with Lexapro will be continued.    3. Esophageal Stricture.  She had an esophageal dilation procedure this year. A referral to speech therapy will be made to rule out aspiration events and address swallowing difficulties. Barium swallow studies will be considered if needed.  Previous results and documentation were reviewed.    4. Abnormal Uterine Bleeding.  She has not had her period in 4 months. Lab tests will be ordered to investigate potential causes, including PCOS or menopausal symptoms. Transvaginal ultrasounds will be avoided if possible.    5. Congenital Foot Abnormality.  She has had multiple surgeries on her foot and has hardware in place. A referral to a prosthetist will be made for new braces.    6. Memory Concerns.  She will start donepezil (Aricept) on Friday as recommended by her neurologist to address memory concerns. Potential side effects and the plan to monitor its effectiveness were discussed.    Follow-up  Return in 3 months for follow-up.    Results      Healthcare Maintenance:  Counseling provided based on age appropriate USPSTF guidelines.       Hilda Day voices understanding and acceptance of this advice and will call back with any further questions or concerns. AVS with preventive healthcare tips printed for patient.     “Discussed risks/benefits to  vaccination, reviewed components of the vaccine, discussed VIS, discussed informed consent, informed consent obtained. Patient/Parent was allowed to accept or refuse vaccine. Questions answered to satisfactory state of patient/Parent. We reviewed typical age appropriate and seasonally appropriate vaccinations. Reviewed immunization history and updated state vaccination form as needed. Patient was counseled on COVID-19  Influenza    Follow Up:   Return in about 3 months (around 2/6/2025) for Recheck.    Patient or patient representative verbalized consent for the use of Ambient Listening during the visit with  Ralph Newman MD for chart documentation. 11/6/2024  15:46 EST      Ralph Newman MD  [facility] [place] MARTHA Simmons

## 2024-11-07 LAB
BASOPHILS # BLD AUTO: 0.04 10*3/MM3 (ref 0–0.2)
BASOPHILS NFR BLD AUTO: 0.9 % (ref 0–1.5)
DEPRECATED RDW RBC AUTO: 46.2 FL (ref 37–54)
EOSINOPHIL # BLD AUTO: 0.14 10*3/MM3 (ref 0–0.4)
EOSINOPHIL NFR BLD AUTO: 3 % (ref 0.3–6.2)
ERYTHROCYTE [DISTWIDTH] IN BLOOD BY AUTOMATED COUNT: 12.9 % (ref 12.3–15.4)
HCT VFR BLD AUTO: 38.6 % (ref 34–46.6)
HGB BLD-MCNC: 12.8 G/DL (ref 12–15.9)
IMM GRANULOCYTES # BLD AUTO: 0.05 10*3/MM3 (ref 0–0.05)
IMM GRANULOCYTES NFR BLD AUTO: 1.1 % (ref 0–0.5)
LYMPHOCYTES # BLD AUTO: 1.39 10*3/MM3 (ref 0.7–3.1)
LYMPHOCYTES NFR BLD AUTO: 29.6 % (ref 19.6–45.3)
MCH RBC QN AUTO: 32.5 PG (ref 26.6–33)
MCHC RBC AUTO-ENTMCNC: 33.2 G/DL (ref 31.5–35.7)
MCV RBC AUTO: 98 FL (ref 79–97)
MONOCYTES # BLD AUTO: 0.43 10*3/MM3 (ref 0.1–0.9)
MONOCYTES NFR BLD AUTO: 9.1 % (ref 5–12)
NEUTROPHILS NFR BLD AUTO: 2.65 10*3/MM3 (ref 1.7–7)
NEUTROPHILS NFR BLD AUTO: 56.3 % (ref 42.7–76)
NRBC BLD AUTO-RTO: 0 /100 WBC (ref 0–0.2)
PLATELET # BLD AUTO: 213 10*3/MM3 (ref 140–450)
PMV BLD AUTO: 11.3 FL (ref 6–12)
RBC # BLD AUTO: 3.94 10*6/MM3 (ref 3.77–5.28)
WBC NRBC COR # BLD AUTO: 4.7 10*3/MM3 (ref 3.4–10.8)

## 2024-11-08 LAB
DHEA-S SERPL-MCNC: 114 UG/DL (ref 84.8–378)
INSULIN SERPL-ACNC: 28.5 UIU/ML (ref 2.6–24.9)

## 2024-11-12 LAB
ESTROGEN SERPL-MCNC: 608 PG/ML
MIS SERPL-MCNC: 0.03 NG/ML

## 2024-11-12 NOTE — PROGRESS NOTES
Neuro Office Visit      Encounter Date: 2024   Patient Name: Hilda Herrera  : 1990   MRN: [id number]   PCP:  Ralph Newman MD     Chief Complaint:    Chief Complaint   Patient presents with    Anxiety     F/U       History of Present Illness: Hilda Herrera is a 33 y.o. female who is here today in Neurology for memory loss.    Last visit with me on 2024, referral for neuropsych testing.    Accompanied by her mother who is her primary caregiver.    Neuropsych testing noted cognitive deficits that were felt to be lifelong to a degree.  It was also noted that approximately 50% of people with Down syndrome would develop clinical symptoms of Alzheimer's disease by the age of 60.  It was recommended that Hilda start donepezil for memory protection and cognitive stability.    MRI of the brain has been ordered.  History of Present Illness  Mom reports that Lexapro has been somewhat beneficial in managing her anxiety.  Hilda had been more clingy prior to starting Lexapro and mom noticed a huge difference since then.  Hilda had increased anxiety yesterday but she had also started her period and mom feels like that may have something to do with the increase.     She has recently started taking donepezil, which she began on Saturday, and has been tolerating it well.     FAMILY HISTORY  Her father developed dementia at the age of 67. Her grandmother developed dementia in her 50s. Her sister developed dementia in her 40s.      Subjective      Past Medical History:   Past Medical History:   Diagnosis Date    Anxiety     Arthritis     Developmental delay Birth    Difficulty walking     on uneven ground and sand    Down syndrome     Family history of Alzheimer's disease 2024    Headache, tension-type     every now and then    Heart murmur     Memory loss     I see signs of small things    Migraine     Hormonal with periods    Shingles        Past Surgical History:   Past Surgical History:   Procedure  Laterality Date    CARDIAC SURGERY      valve replaced at 15 months old    CARDIAC VALVE REPLACEMENT  1991    AVSD    CHOLECYSTECTOMY      FOOT SURGERY Right     FOOT SURGERY Right     FOOT SURGERY Right     TONSILLECTOMY         Family History:   Family History   Problem Relation Age of Onset    Migraines Mother         Have had them since teenage years    Neuropathy Mother         Legs    Dementia Father     Dementia Maternal Grandmother        Social History:   Social History     Socioeconomic History    Marital status: Single   Tobacco Use    Smoking status: Never     Passive exposure: Never    Smokeless tobacco: Never   Vaping Use    Vaping status: Never Used   Substance and Sexual Activity    Alcohol use: Never    Drug use: Never    Sexual activity: Never       Medications:     Current Outpatient Medications:     donepezil (Aricept) 5 MG tablet, Take 1 tablet by mouth Every Night., Disp: 30 tablet, Rfl: 6    Enbrel SureClick 50 MG/ML solution auto-injector, Inject 50 mg under the skin into the appropriate area as directed Every 7 (Seven) Days., Disp: , Rfl:     hydrOXYzine (ATARAX) 25 MG tablet, Take 1 tablet by mouth At Night As Needed for Anxiety (insomnia)., Disp: 30 tablet, Rfl: 3    pantoprazole (PROTONIX) 40 MG EC tablet, Take 1 tablet by mouth once daily, Disp: 30 tablet, Rfl: 0    escitalopram (Lexapro) 20 MG tablet, Take 1 tablet by mouth Daily., Disp: 30 tablet, Rfl: 2    Allergies:   Allergies   Allergen Reactions    Penicillins Rash    Cephalexin Rash and Unknown (See Comments)    Codeine Nausea And Vomiting and Unknown (See Comments)       PHQ-9 Total Score:     STEADI Fall Risk Assessment has not been completed.    Objective     Physical Exam:     Neurological Exam  Mental Status  Awake and alert. Oriented only to person and place. Mild dysarthria present.    Motor   Strength is 5/5 throughout all four extremities.    Reflexes  Deep tendon reflexes are 2+ and symmetric in all four  "extremities.    Gait  Casual gait is normal including stance, stride, and arm swing.        Vital Signs:   Vitals:    11/13/24 0832   BP: 118/68   Pulse: 101   SpO2: 99%   Weight: 68.9 kg (152 lb)   Height: 139.7 cm (55\")     Body mass index is 35.33 kg/m².     Results:   Results  Testing  Mini-Mental status exam: Extremely low score on verbal immediate memory, less than one percentile indicates an inefficient verbal learning and encoding. Delayed functions, visual and verbal and visual delayed memory functions slightly impaired. Expressive language abilities extremely low. Word retrieval borderline. Verbal fluency, low, auditory attention, processing speed, so all of those things that we would expect. Screening measure for anxiety consistent with mild anxiety.     Imaging:   No Images in the past 120 days found..     Labs:   No results found for: \"CMP\", \"PROTEIN\", \"ANTIMOGAB\", \"JMDXZJ4IVQV\", \"JCVRESULT\", \"QUANTTBGOLD\", \"CBCDIF\", \"IGGALBSER\"     Assessment / Plan      Assessment/Plan:   Diagnoses and all orders for this visit:    1. Memory change (Primary)  Comments:  Continue donepezil    2. Anxiety  Comments:  Increase Lexapro    3. Family history of Alzheimer's disease    4. Down syndrome    Other orders  -     Discontinue: escitalopram (Lexapro) 20 MG tablet; Take 1 tablet by mouth Daily.  Dispense: 30 tablet; Refill: 6  -     escitalopram (Lexapro) 20 MG tablet; Take 1 tablet by mouth Daily.  Dispense: 30 tablet; Refill: 2         Assessment & Plan  1. Anxiety.  The dosage of Lexapro will be increased to 20 mg to help manage her anxiety. The potential side effects, including gastrointestinal upset and insomnia, have been discussed. Coping skills are being worked on to help manage anxiety.    2. Memory Impairment.  The patient has been started on donepezil and reports doing well since starting the medication on Saturday. An MRI has been ordered to assess brain volume and other significant findings. The patient " will call central scheduling to set up the MRI appointment. The results will be reviewed to provide more information on the patient's condition.    Follow-up  Return in 3 months for follow up.    Patient Education:     Reviewed medications, potential side effects and signs and symptoms to report. Discussed risk versus benefits of treatment plan with patient and/or family-including medications, labs and radiology that may be ordered. Addressed questions and concerns during visit. Patient and/or family verbalized understanding and agree with plan. Instructed to call the office with any questions and report to ER with any life-threatening symptoms.     Follow Up:   Return in about 3 months (around 2/13/2025).    I spent 40 minutes caring for Hilda on this date of service. This time includes time spent by me in the following activities: preparing for the visit, performing a medically appropriate examination and/or evaluation, counseling and educating the patient/family/caregiver, documenting information in the medical record, independently interpreting results and communicating that information with the patient/family/caregiver, and ordering medications.        During this visit the following were done:  Labs Reviewed []    Labs Ordered []    Radiology Reports Reviewed []    Radiology Ordered []    PCP Records Reviewed []    Referring Provider Records Reviewed []    ER Records Reviewed []    Hospital Records Reviewed []    History Obtained From Family []    Radiology Images Reviewed []    Other Reviewed [x]    Records Requested []      Patient or patient representative verbalized consent for the use of Ambient Listening during the visit with  RACHAEL Mc for chart documentation. 11/13/2024  10:47 EST    RACHAEL Mc   [facility]  [address] [place] [address] [address]  [facility] [id number] [phone]

## 2024-11-13 ENCOUNTER — OFFICE VISIT (OUTPATIENT)
Dept: NEUROLOGY | Facility: CLINIC | Age: 34
End: 2024-11-13
Payer: MEDICAID

## 2024-11-13 VITALS
SYSTOLIC BLOOD PRESSURE: 118 MMHG | HEART RATE: 101 BPM | DIASTOLIC BLOOD PRESSURE: 68 MMHG | HEIGHT: 55 IN | WEIGHT: 152 LBS | BODY MASS INDEX: 35.18 KG/M2 | OXYGEN SATURATION: 99 %

## 2024-11-13 DIAGNOSIS — F41.9 ANXIETY: ICD-10-CM

## 2024-11-13 DIAGNOSIS — R41.3 MEMORY CHANGE: Primary | ICD-10-CM

## 2024-11-13 DIAGNOSIS — Q90.9 DOWN SYNDROME: ICD-10-CM

## 2024-11-13 DIAGNOSIS — Z82.0 FAMILY HISTORY OF ALZHEIMER'S DISEASE: ICD-10-CM

## 2024-11-13 LAB — TESTOST FREE SERPL-MCNC: 1.8 PG/ML (ref 0–4.2)

## 2024-11-13 PROCEDURE — 99214 OFFICE O/P EST MOD 30 MIN: CPT | Performed by: NURSE PRACTITIONER

## 2024-11-13 PROCEDURE — 1160F RVW MEDS BY RX/DR IN RCRD: CPT | Performed by: NURSE PRACTITIONER

## 2024-11-13 PROCEDURE — 1159F MED LIST DOCD IN RCRD: CPT | Performed by: NURSE PRACTITIONER

## 2024-11-13 RX ORDER — ESCITALOPRAM OXALATE 20 MG/1
20 TABLET ORAL DAILY
Qty: 30 TABLET | Refills: 2 | Status: SHIPPED | OUTPATIENT
Start: 2024-11-13 | End: 2025-11-13

## 2024-11-13 RX ORDER — ESCITALOPRAM OXALATE 20 MG/1
20 TABLET ORAL DAILY
Qty: 30 TABLET | Refills: 6 | Status: SHIPPED | OUTPATIENT
Start: 2024-11-13 | End: 2024-11-13

## 2024-11-15 LAB — DHEA SERPL-MCNC: 74 NG/DL (ref 31–701)

## 2024-11-16 LAB — 17OHP SERPL-MCNC: 85 NG/DL

## 2024-12-02 ENCOUNTER — HOSPITAL ENCOUNTER (OUTPATIENT)
Dept: SPEECH THERAPY | Facility: HOSPITAL | Age: 34
Setting detail: THERAPIES SERIES
Discharge: HOME | End: 2024-12-02
Payer: MEDICAID

## 2024-12-02 DIAGNOSIS — R13.10 DYSPHAGIA, UNSPECIFIED TYPE: Primary | ICD-10-CM

## 2024-12-02 PROCEDURE — 92610 EVALUATE SWALLOWING FUNCTION: CPT | Performed by: SPEECH-LANGUAGE PATHOLOGIST

## 2024-12-02 NOTE — THERAPY EVALUATION
Outpatient Speech Language Pathology   Adult Swallow Initial Evaluation  [facility]     Patient Name: Hilda Herrera  : 1990  MRN: [id number]  Today's Date: 2024         Visit Date: 2024   Patient Active Problem List   Diagnosis    Gastroesophageal reflux disease    Down syndrome    Esophageal stricture    Anxiety    Memory loss    Abnormal uterine bleeding    Congenital foot abnormality        Past Medical History:   Diagnosis Date    Anxiety     Arthritis     Developmental delay Birth    Difficulty walking     on uneven ground and sand    Down syndrome     Family history of Alzheimer's disease 2024    Headache, tension-type     every now and then    Heart murmur     Memory loss     I see signs of small things    Migraine     Hormonal with periods    Shingles         Past Surgical History:   Procedure Laterality Date    CARDIAC SURGERY      valve replaced at 15 months old    CARDIAC VALVE REPLACEMENT      AVSD    CHOLECYSTECTOMY      FOOT SURGERY Right     FOOT SURGERY Right     FOOT SURGERY Right     TONSILLECTOMY           Visit Dx:     ICD-10-CM ICD-9-CM   1. Dysphagia, unspecified type  R13.10 787.20            OP SLP Assessment/Plan - 24 0900          SLP Assessment    Functional Problems Swallowing  -RB    Impact on Function: Swallowing Risk of aspiration  -RB    Clinical Impression: Swallowing dysphagia unspecified  -RB    Functional Problems Comment Impacts QOL, at risk for aspiration  -RB    Clinical Impression Comments Recommend MBSS if MD in agreement.  -RB    Please refer to paper survey for additional self-reported information Yes  -RB    Please refer to items scanned into chart for additional diagnostic informaiton and handouts as provided by clinician Yes  -RB    SLP Diagnosis dysphagia  -RB    Prognosis Good (comment)  -RB    Patient/caregiver participated in establishment of treatment plan and goals Yes  -RB    Patient would benefit from skilled therapy  "intervention Yes  -RB       SLP Plan    Planned CPT's? SLP MBS: 51329;SLP SWALLOW THERAPY: 71849  -RB    Expected Duration of Therapy Session (SLP Eval) 45  -RB    Plan Comments Plan for instrumental and then will treat as clinically indicated.  -RB              User Key  (r) = Recorded By, (t) = Taken By, (c) = Cosigned By      Initials Name Provider Type    RB Tanya Tariq, SLP Speech and Language Pathologist                     SLP Adult Swallow Evaluation       Row Name 12/02/24 0900       Rehab Evaluation    Document Type evaluation  -RB    Total Evaluation Minutes, SLP 45  -RB    Subjective Information no complaints  -RB    Patient Observations alert;cooperative;agree to therapy  -RB    Patient/Family/Caregiver Comments/Observations Mother present  -RB    Patient Effort good  -RB    Symptoms Noted During/After Treatment none  -RB       General Information    Patient Profile Reviewed yes  -RB    Pertinent History Of Current Problem PMH: down syndrome, memory loss, GERD. Pt came in today with her mother. Pt reports trouble swallowing and difficulty with swallowing pills. Reports coughing and gaging with any texture. No history of aspiration PNA. Pt had her throat stretched this past year. She currently takes her pills with harrison. She reports she sometimes coughs with water and it sometimes \"goes down the wrong way\". Pt has a history of reflux and takes medication. Pt and mother report difficulty with foods such as meats, raw vegetables, and breads. Swallowing difficulty is inconsistent. No food allergies.  -RB    Current Method of Nutrition regular textures;thin liquids;whole  -RB    Precautions/Limitations, Vision WFL with corrective lenses;for purposes of eval  -RB    Precautions/Limitations, Hearing WFL;for purposes of eval  -RB    Prior Level of Function-Swallowing no diet consistency restrictions  -RB    Plans/Goals Discussed with patient and family;agreed upon  -RB    Patient's Goals for Discharge " "eat/drink without coughing/choking  -RB    Family Goals for Discharge patient able to eat/drink without coughing/choking  -RB       Pain    Pretreatment Pain Rating 0/10 - no pain  -RB    Posttreatment Pain Rating 0/10 - no pain  -RB       Oral Motor Structure and Function    Dentition Assessment natural, present and adequate  -RB    Secretion Management WNL/WFL  -RB    Mucosal Quality moist, healthy  -RB    Gag Response WFL  -RB    Volitional Swallow WFL  -RB       Oral Musculature and Cranial Nerve Assessment    Oral Motor General Assessment generalized oral motor weakness  -RB       General Eating/Swallowing Observations    Respiratory Support Currently in Use room air  -RB    Eating/Swallowing Skills self-fed;appropriate self-feeding skills observed  -RB    Positioning During Eating upright 90 degree;upright in chair  -RB    Utensils Used spoon;cup  -RB    Consistencies Trialed mixed consistency;regular textures;pureed;thin liquids  -RB       Respiratory    Respiratory Status WFL;room air  -RB       Clinical Swallow Eval    Oral Prep Phase WFL  -RB    Oral Transit WFL  -RB    Oral Residue WFL  -RB    Pharyngeal Phase no overt signs/symptoms of pharyngeal impairment  pt and mother report coughing and gagging across consistencies such as pills, solids, liquids.  -RB    Esophageal Phase --  Pt has history of acid reflux and esophageal stricture.  -RB    Clinical Swallow Evaluation Summary Pt given the EAT-10. PT scored a 13/40. Pt reported a score of 3 for \"the pleasure of eating is affcted by my swallowing\". Pt demonstrated no overt s/sx of aspiration across consistencies. Pt and her mom do report ongoing and chrnoic symptoms of dysphagia and due to pt's diagnosis I would recommend an instumental if MD is in agreement. Pt does have a remarkable esophageal dysphagia hx.  -RB              User Key  (r) = Recorded By, (t) = Taken By, (c) = Cosigned By      Initials Name Provider Type    RB Marciano, Tanya, SLP Speech " and Language Pathologist                     ACTIVITY  ACCURACY ASSISTANCE NEEDED   LTGs:               STG:    Pt will participate in instrumental swallow study.               STG:                     STG:      STG:   Goals to be added pending MBSS if needed          Certification: 12/02/24- 03/02/25     OP SLP Education       Row Name 12/02/24 0900       Education    Barriers to Learning No barriers identified  -RB    Education Provided Described results of evaluation;Patient expressed understanding of evaluation;Family/caregivers expressed understanding of evaluation;Patient participated in establishing goals and treatment plan  -RB    Assessed Learning needs;Learning motivation;Learning preferences;Learning readiness  -RB    Learning Motivation Strong  -RB    Learning Method Explanation;Demonstration;Teach back;Written materials  -RB    Teaching Response Verbalized understanding;Demonstrated understanding;Reinforcement needed  -RB    Education Comments HEP: Esophageal dysphagia handout, aspiation pna risk factors  -RB              User Key  (r) = Recorded By, (t) = Taken By, (c) = Cosigned By      Initials Name Effective Dates    Tanya Khoury SLP 10/22/24 -                    SLP OP Goals       Row Name 12/02/24 1500          Subjective Comments    Subjective Comments Pt reports she had a good Thanksgiving.  -RB        Subjective Pain    Able to rate subjective pain? yes  -RB     Pre-Treatment Pain Level 0  -RB     Post-Treatment Pain Level 0  -RB               User Key  (r) = Recorded By, (t) = Taken By, (c) = Cosigned By      Initials Name Provider Type    Tanya Khoury SLP Speech and Language Pathologist                             Time Calculation:        Therapy Charges for Today       Code Description Service Date Service Provider Modifiers Qty    [id number]  ST EVAL ORAL PHARYNG SWALLOW 3 12/2/2024 Tanya Tariq SLP GN 1          PARDEEP Orr  provided treatment under  my direct supervision.        [facility] Pathology   [address]  [place] [address]  Tanya Tariq MA CCC-SLP [facility] license: [id number]        PHYSICIAN: Ralph Newman MD    NPI: [id number]         I certify that the therapy services are furnished while this patient is under my care.  The services outlined above are required by this patient, and will be reviewed every 90 days.                PHYSICIAN:                                         DATE:      Please sign and return via fax to [phone].   Thank you,   [facility] Speech[facility]rapy.    12/2/2024

## 2024-12-09 ENCOUNTER — APPOINTMENT (OUTPATIENT)
Dept: SPEECH THERAPY | Facility: HOSPITAL | Age: 34
End: 2024-12-09
Payer: MEDICAID

## 2024-12-10 ENCOUNTER — HOSPITAL ENCOUNTER (OUTPATIENT)
Dept: MRI IMAGING | Facility: HOSPITAL | Age: 34
Discharge: HOME | End: 2024-12-10
Admitting: NURSE PRACTITIONER
Payer: MEDICAID

## 2024-12-10 DIAGNOSIS — Q90.9 DOWN SYNDROME: ICD-10-CM

## 2024-12-10 DIAGNOSIS — R41.3 MEMORY CHANGE: ICD-10-CM

## 2024-12-10 DIAGNOSIS — Z82.0 FAMILY HISTORY OF ALZHEIMER'S DISEASE: ICD-10-CM

## 2024-12-10 PROCEDURE — A9577 INJ MULTIHANCE: HCPCS | Performed by: NURSE PRACTITIONER

## 2024-12-10 PROCEDURE — 70553 MRI BRAIN STEM W/O & W/DYE: CPT

## 2024-12-10 PROCEDURE — 25510000002 GADOBENATE DIMEGLUMINE 529 MG/ML SOLUTION: Performed by: NURSE PRACTITIONER

## 2024-12-10 RX ADMIN — GADOBENATE DIMEGLUMINE 14 ML: 529 INJECTION, SOLUTION INTRAVENOUS at 15:46

## 2024-12-11 ENCOUNTER — TELEPHONE (OUTPATIENT)
Dept: NEUROLOGY | Facility: CLINIC | Age: 34
End: 2024-12-11
Payer: MEDICAID

## 2024-12-11 NOTE — TELEPHONE ENCOUNTER
Left message for Hilda's mother, Marycruz regarding MRI of the brain results.  The MRI shows changes consistent with Down syndrome but no acute findings or findings significant for dementia.

## 2024-12-16 ENCOUNTER — APPOINTMENT (OUTPATIENT)
Dept: SPEECH THERAPY | Facility: HOSPITAL | Age: 34
End: 2024-12-16
Payer: MEDICAID

## 2024-12-16 RX ORDER — DONEPEZIL HYDROCHLORIDE 5 MG/1
5 TABLET, FILM COATED ORAL NIGHTLY
Qty: 30 TABLET | Refills: 6 | OUTPATIENT
Start: 2024-12-16 | End: 2025-12-16

## 2024-12-16 NOTE — TELEPHONE ENCOUNTER
Rx Refill Note  Requested Prescriptions     Pending Prescriptions Disp Refills    donepezil (Aricept) 5 MG tablet 30 tablet 6     Sig: Take 1 tablet by mouth Every Night.      Last filled: 11/4/24, 30 with 6 Refills  Last office visit with prescribing clinician: 11/13/2024      Next office visit with prescribing clinician: 2/18/2025     Kati Hussein MA  12/16/24, 09:23 EST

## 2024-12-17 ENCOUNTER — HOSPITAL ENCOUNTER (OUTPATIENT)
Dept: GENERAL RADIOLOGY | Facility: HOSPITAL | Age: 34
Discharge: HOME | End: 2024-12-17
Admitting: STUDENT IN AN ORGANIZED HEALTH CARE EDUCATION/TRAINING PROGRAM
Payer: MEDICAID

## 2024-12-17 DIAGNOSIS — K22.2 ESOPHAGEAL STRICTURE: ICD-10-CM

## 2024-12-17 PROCEDURE — 92611 MOTION FLUOROSCOPY/SWALLOW: CPT

## 2024-12-17 PROCEDURE — 74230 X-RAY XM SWLNG FUNCJ C+: CPT

## 2024-12-17 RX ADMIN — BARIUM SULFATE 20 ML: 400 PASTE ORAL at 14:25

## 2024-12-17 RX ADMIN — BARIUM SULFATE 55 ML: 0.81 POWDER, FOR SUSPENSION ORAL at 14:26

## 2024-12-17 NOTE — MBS/VFSS/FEES
"Outpatient Speech Language Pathology   Adult Swallow Initial Evaluation   [facility]  Modified Barium Swallow Study (MBS)       Patient Name: Hilda Herrera  : 1990  MRN: [id number]  Today's Date: 2024         Visit Date: 2024   Patient Active Problem List   Diagnosis    Gastroesophageal reflux disease    Down syndrome    Esophageal stricture    Anxiety    Memory loss    Abnormal uterine bleeding    Congenital foot abnormality        Past Medical History:   Diagnosis Date    Anxiety     Arthritis     Developmental delay Birth    Difficulty walking     on uneven ground and sand    Down syndrome     Family history of Alzheimer's disease 2024    Headache, tension-type     every now and then    Heart murmur     Memory loss     I see signs of small things    Migraine     Hormonal with periods    Shingles         Past Surgical History:   Procedure Laterality Date    CARDIAC SURGERY      valve replaced at 15 months old    CARDIAC VALVE REPLACEMENT      AVSD    CHOLECYSTECTOMY      FOOT SURGERY Right     FOOT SURGERY Right     FOOT SURGERY Right     TONSILLECTOMY           Visit Dx:     ICD-10-CM ICD-9-CM   1. Esophageal stricture  K22.2 530.3                SLP Adult Swallow Evaluation       Row Name 24 1400       Rehab Evaluation    Document Type evaluation  -    Subjective Information no complaints  -    Patient Observations alert;cooperative  -    Patient/Family/Caregiver Comments/Observations Mother present  -    Patient Effort good  -    Symptoms Noted During/After Treatment none  -       General Information    Patient Profile Reviewed yes  -    Pertinent History Of Current Problem Pt referred for MBS by Dr. Ralph Newman 2' reported symptoms of pharyngeal dysphagia. Pt reports intermittent \"coughing & choking\" w/ both solids & liquids; as well as larger PO medication. Recent CXR not available, however pt/mother deny recent PNA or respiratory concerns. Hx: " GERD, esophageal stricture s/p multiple dilations, most recent dilation May '24 (Pt's mother reports dilations completed every 2 years)  -    Current Method of Nutrition regular textures;thin liquids  -    Precautions/Limitations, Vision WFL with corrective lenses;for purposes of eval  -    Precautions/Limitations, Hearing WFL;for purposes of eval  -    Prior Level of Function-Swallowing no diet consistency restrictions  -    Plans/Goals Discussed with patient and family;agreed upon  [facility]    Barriers to Rehab none identified  -    Patient's Goals for Discharge patient did not state  -    Family Goals for Discharge family did not state  -       Pain    Additional Documentation Pain Scale: FACES Pre/Post-Treatment (Group)  -       Pain Scale: FACES Pre/Post-Treatment    Pain: FACES Scale, Pretreatment 0-->no hurt  -    Posttreatment Pain Rating 0-->no hurt  -       MBS/VFSS    Utensils Used spoon;cup;straw  -    Consistencies Trialed thin liquids;pudding thick;barium pill;regular textures  [facility]       MBS/VFSS Interpretation    Oral Prep Phase [facility]  -    Oral Transit Phase WF  -    Oral Residue [facility]  -    VFSS Summary Grossly functional oral and pharyngeal phases of swallow. No penetration or aspiration accross trials of thin, pudding or regular solid consistenices. Barium pill observed to fully pass. No significant pharyngeal residue.   Recommend:   1. Regular diet w/ thin liquids   2. Meds whole w/ applesauce/pudding vs whole w/ thins per pt preference   3. GI consult given pt/family reported symptoms  [facility]       Initiation of Pharyngeal Swallow    Initiation of Pharyngeal Swallow bolus in valleculae  -    Pharyngeal Phase functional pharyngeal phase of swallowing  -       SLP Evaluation Clinical Impression    SLP Swallowing Diagnosis swallow WFL/no suspected pharyngeal impairment;functional oral phase  -    Functional Impact no impact on function  -    Swallow Criteria for Skilled  Therapeutic Interventions Met no problems identified which require skilled intervention  -       Recommendations    Therapy Frequency (Swallow) evaluation only  -    SLP Diet Recommendation regular textures;thin liquids  -    Recommended Diagnostics No further SLP services recommended  -    Recommended Precautions and Strategies general aspiration precautions;reflux precautions;upright posture during/after eating  -    Oral Care Recommendations Oral Care BID/PRN;Toothbrush  -    SLP Rec. for Method of Medication Administration meds whole;with puree;with thin liquids  per pt preference  -    Monitor for Signs of Aspiration yes;notify SLP if any concerns  -    Anticipated Discharge Disposition (SLP) home  -    Demonstrates Need for Referral to Another Service gastroenterology;dedicated esophageal assessment  -    Swallowing Considerations per Physician Discretion medical management of suspected esophageal dysphagia, as indicated  -              User Key  (r) = Recorded By, (t) = Taken By, (c) = Cosigned By      Initials Name Provider Type    Chaya Morales MS CCC-SLP Speech and Language Pathologist                                   OP SLP Education       Row Name 12/17/24 7629       Education    Barriers to Learning No barriers identified  -    Education Provided Described results of evaluation;Patient expressed understanding of evaluation;Family/caregivers expressed understanding of evaluation;Patient participated in establishing goals and treatment plan;Family/caregivers participated in establishing goals and treatment plan  -    Assessed Learning needs;Learning motivation;Learning preferences  -    Learning Motivation Strong  -    Learning Method Explanation;Demonstration  -    Teaching Response Verbalized understanding  -              User Key  (r) = Recorded By, (t) = Taken By, (c) = Cosigned By      Initials Name Effective Dates     Chaya Chavarria MS CCC-SLP  05/12/23 -                                Time Calculation:   SLP Start Time: 1400  Untimed Charges  25907-YC Motion Fluoro Eval Swallow Minutes: 75  Total Minutes  Untimed Charges Total Minutes: 75   Total Minutes: 75    Therapy Charges for Today       Code Description Service Date Service Provider Modifiers Qty    [id number]  ST MOTION FLUORO EVAL SWALLOW 5 12/17/2024 Chaya Chavarria, MS ECHEVERRIA-SLP GN 1                     MS EVARISTO Ceron  12/17/2024

## 2024-12-18 ENCOUNTER — HOSPITAL ENCOUNTER (OUTPATIENT)
Dept: SPEECH THERAPY | Facility: HOSPITAL | Age: 34
Setting detail: THERAPIES SERIES
Discharge: HOME | End: 2024-12-18
Payer: MEDICAID

## 2024-12-18 DIAGNOSIS — R13.19 OTHER DYSPHAGIA: Primary | ICD-10-CM

## 2024-12-18 PROCEDURE — 92526 ORAL FUNCTION THERAPY: CPT | Performed by: SPEECH-LANGUAGE PATHOLOGIST

## 2024-12-18 NOTE — THERAPY DISCHARGE NOTE
Outpatient Speech Language Pathology   Adult Swallow Treatment Note/Discharge Summary   [facility]     Patient Name: Hilda Herrera  : 1990  MRN: [id number]  Today's Date: 2024         Visit Date: 2024   Patient Active Problem List   Diagnosis    Gastroesophageal reflux disease    Down syndrome    Esophageal stricture    Anxiety    Memory loss    Abnormal uterine bleeding    Congenital foot abnormality        Visit Dx:    ICD-10-CM ICD-9-CM   1. Other dysphagia  R13.19 787.29        OP SLP Assessment/Plan - 24 09          SLP Assessment    Clinical Impression Comments PT had her MBSS completed. Grossly WFL orophrangyeal swallow and recommended a GI consult. Educated pt and her mom on the results of the swallow study and the recommendations. Provided education on oral care, reflux precautions and maintainence excercises. Pt and her mom expressed understanding. Discharge with HEP and return as needed  -RB       SLP Plan    Planned CPT's? SLP SWALLOW THERAPY: 80459  -RB    Plan Comments discharge  -RB              User Key  (r) = Recorded By, (t) = Taken By, (c) = Cosigned By      Initials Name Provider Type    Tanya Khoury, SLP Speech and Language Pathologist                        ACTIVITY  ACCURACY ASSISTANCE NEEDED    LTGs:                      STG:     Pt will participate in instrumental swallow study.               MET- went over results today, oral care, reflux precautions, maintenance exercises  Answered questions about swallowing and the results of the study        STG:                               STG:         STG:                Certification: 24- 25                   SLP OP Goals       Row Name 24 0900          Subjective Comments    Subjective Comments PT had her swallow study yesterday  -RB        Subjective Pain    Able to rate subjective pain? yes  -RB     Pre-Treatment Pain Level 0  -RB     Post-Treatment Pain Level 0  -RB               User Key   (r) = Recorded By, (t) = Taken By, (c) = Cosigned By      Initials Name Provider Type    Tanya Khoury SLP Speech and Language Pathologist                     OP SLP Education       Row Name 12/18/24 0900       Education    Barriers to Learning No barriers identified  -RB    Education Provided Family/caregivers demonstrated recommended strategies;Patient demonstrated recommended strategies  -RB    Assessed Learning needs;Learning motivation;Learning readiness;Learning preferences  -RB    Learning Motivation Strong  -RB    Learning Method Explanation;Demonstration;Teach back;Written materials  -RB    Teaching Response Verbalized understanding;Demonstrated understanding;Reinforcement needed  -RB    Education Comments HEP: MBSS education, maintainence excercises, oral care, reflux precautions  -RB              User Key  (r) = Recorded By, (t) = Taken By, (c) = Cosigned By      Initials Name Effective Dates    Tanya Khoury SLP 10/22/24 -                               Time Calculation:        Therapy Charges for Today       Code Description Service Date Service Provider Modifiers Qty    99522996273 HC ST TREATMENT SWALLOW 1 12/18/2024 Tanya Tariq SLP GN 1                    Tanya Tariq MA CCC-SLP [facility]  [place] license: [id number]    12/18/2024

## 2025-01-28 ENCOUNTER — HOSPITAL ENCOUNTER (OUTPATIENT)
Dept: OCCUPATIONAL THERAPY | Facility: HOSPITAL | Age: 35
Setting detail: THERAPIES SERIES
Discharge: HOME | End: 2025-01-28
Payer: MEDICAID

## 2025-01-28 DIAGNOSIS — Q90.9 HISTORY OF DOWN SYNDROME: Primary | ICD-10-CM

## 2025-01-28 PROCEDURE — 97166 OT EVAL MOD COMPLEX 45 MIN: CPT

## 2025-01-28 NOTE — THERAPY EVALUATION
Occupational Therapy Initial Evaluation     [facility]          [address]     Patient: Hilda Herrera   : 1990  MRN: [id number]   Diagnosis/ICD-10 Code:      ICD-10-CM ICD-9-CM   1. History of Down syndrome  Q90.9 758.0      Referring practitioner: Ralph Newman MD  Today's Date: 2025      Subjective:     Subjective Evaluation    History of Present Illness  Mechanism of injury: Dx w/ DS. PMH of RA. Seen by Rheumatology. Pt will be seen by a dentist on the . Reports issues with chewing and pocketing chewy textured foods starting in October this year. Difficulties with shaving under arms, managing buttons and hand weakness.  Pt enjoys art work. B AFOs.       Patient Occupation: Works at [employer]   Precautions and Work Restrictions: Files papers, oversees people that are giving donationsSocial Support  Lives in: multiple-level home  Lives with: adult children    Hand dominance: right    Patient Goals  Patient goals for therapy: independence with ADLs/IADLs and increased strength  Patient goal: Buttoning; hand strength/ROM and oral motor control           Objective          Active Range of Motion   Left Shoulder   Normal active range of motion    Right Shoulder   Normal active range of motion    Left Elbow   Normal active range of motion    Right Elbow   Normal active range of motion    Additional Active Range of Motion Details  No radial deviation  No wrist flexion    B ulnar drift     Strength/Myotome Testing     Left Shoulder     Planes of Motion   Flexion: 4-   Extension: 4-   Abduction: 4-   Adduction: 4-     Right Shoulder     Planes of Motion   Flexion: 4-   Extension: 4-   Abduction: 4-   Adduction: 4-     Left Elbow   Flexion: 4-  Extension: 4-    Right Elbow   Flexion: 4-  Extension: 4-    Additional Strength Details  B hand  too weak to read on dynamometer. Based on clinical observation grossly 3+/5        OT Neuro         Assessment  & Plan       Assessment  Impairments: abnormal or restricted ROM, activity intolerance, impaired physical strength, lacks appropriate home exercise program and pain with function   Functional limitations: carrying objects (Reaching underarm; buttoning clothes )  Assessment details: Pt is a 33 y/o f who presents to OP OT with RA and PMH of Down Syndrome. Pt demonstrates decreased independence, safety, and satisfaction with ADL/IADL tasks and engaging in desired occupations due to BUE weakness, decreased ROM and lack of awareness on joint protection. Pt would benefit from skilled occupational therapy services to address established goals as specified.     Goals  Plan Goals: Client will be indep assist with AE & DME use to increase safety and independence with ADL/IADL tasks by 4 wks.     Client will increase MMT testing grade to 4/5 or greater to demonstrate improved strength and engagement in daily tasks by 4 wks.     Client will improve unilateral UE coordination with distal reaching and cross body placing of objects w/ appropriate force and control 9/10 trials by 4 wks.     Client will demonstrate understanding of UE stretching HEP indep assist to promote increased function and improved comfort level during ADL/IADL tasks by 4 wks.     Client will demonstrate understanding of hand strengthening HEP indep assist to increase independence with ADL/IADL performance tasks by 4 wks.    Client will demonstrate understanding of oral motor exercises to reduce food pocketing by 4 wks.         Plan  Planned modality interventions: thermotherapy (hydrocollator packs) and thermotherapy (paraffin bath)  Planned therapy interventions: ADL retraining, fine motor coordination training, functional ROM exercises, home exercise program, strengthening and therapeutic activities  Frequency: 1x week  Duration in visits: 4  Treatment plan discussed with: patient and caregiver  Plan details: Will establish OT POC and goals to reflect pt  needs and promote increased satisfaction and QOL. Will implement appropriate AE/DME needs, home/activity/environmental modifications, flexibility/ROM/stretching, balance training, NMR techniques, ADL/IADL retraining, FM/GMC training, and other interventions as needed.  CPT codes to include: 31919, 38875, 65203, 61510 and 96095         Eval Complexity: moderate complexity     OT SIGNATURE: HAVEN Medina, OTR/L  [place] License: [id number]  DATE TREATMENT INITIATED: 1/28/2025    Therapy Charges for Today       Code Description Service Date Service Provider Modifiers Qty    01835087735 HC OT EVAL MOD COMPLEXITY 3 1/28/2025 Rosario Riley OT GO 1            Initial Certification Certification Period: 1/28/2025thru4/27/2025  I certify that the therapy services are furnished while this patient is under my care.  The services outlined above are required by this patient, and will be reviewed every 90 days.     PHYSICIAN: Ralph Newman MD  NPI: [id number]                                         DATE:     Please sign and return via fax to [phone].   Thank you,   [facility].

## 2025-02-06 ENCOUNTER — OFFICE VISIT (OUTPATIENT)
Dept: INTERNAL MEDICINE | Facility: CLINIC | Age: 35
End: 2025-02-06
Payer: MEDICAID

## 2025-02-06 VITALS
DIASTOLIC BLOOD PRESSURE: 64 MMHG | SYSTOLIC BLOOD PRESSURE: 108 MMHG | RESPIRATION RATE: 18 BRPM | BODY MASS INDEX: 35.19 KG/M2 | WEIGHT: 151.4 LBS | TEMPERATURE: 97.3 F | HEART RATE: 74 BPM

## 2025-02-06 DIAGNOSIS — F99 INSOMNIA DUE TO OTHER MENTAL DISORDER: ICD-10-CM

## 2025-02-06 DIAGNOSIS — K22.2 ESOPHAGEAL STRICTURE: Primary | ICD-10-CM

## 2025-02-06 DIAGNOSIS — L84 FOOT CALLUS: ICD-10-CM

## 2025-02-06 DIAGNOSIS — F41.9 ANXIETY: ICD-10-CM

## 2025-02-06 DIAGNOSIS — N93.9 ABNORMAL UTERINE BLEEDING: ICD-10-CM

## 2025-02-06 DIAGNOSIS — Q90.9 DOWN SYNDROME: ICD-10-CM

## 2025-02-06 DIAGNOSIS — Q74.2 CONGENITAL FOOT ABNORMALITY: ICD-10-CM

## 2025-02-06 DIAGNOSIS — R53.82 CHRONIC FATIGUE: ICD-10-CM

## 2025-02-06 DIAGNOSIS — R41.3 MEMORY LOSS: ICD-10-CM

## 2025-02-06 DIAGNOSIS — F51.05 INSOMNIA DUE TO OTHER MENTAL DISORDER: ICD-10-CM

## 2025-02-06 DIAGNOSIS — B35.9 TINEA: ICD-10-CM

## 2025-02-06 LAB
BASOPHILS # BLD AUTO: 0.04 10*3/MM3 (ref 0–0.2)
BASOPHILS NFR BLD AUTO: 0.8 % (ref 0–1.5)
CK SERPL-CCNC: 81 U/L (ref 20–180)
CRP SERPL-MCNC: <0.3 MG/DL (ref 0–0.5)
DEPRECATED RDW RBC AUTO: 46.2 FL (ref 37–54)
EOSINOPHIL # BLD AUTO: 0.06 10*3/MM3 (ref 0–0.4)
EOSINOPHIL NFR BLD AUTO: 1.1 % (ref 0.3–6.2)
ERYTHROCYTE [DISTWIDTH] IN BLOOD BY AUTOMATED COUNT: 13.1 % (ref 12.3–15.4)
HCT VFR BLD AUTO: 39.6 % (ref 34–46.6)
HGB BLD-MCNC: 13.5 G/DL (ref 12–15.9)
IMM GRANULOCYTES # BLD AUTO: 0.05 10*3/MM3 (ref 0–0.05)
IMM GRANULOCYTES NFR BLD AUTO: 0.9 % (ref 0–0.5)
LYMPHOCYTES # BLD AUTO: 1.66 10*3/MM3 (ref 0.7–3.1)
LYMPHOCYTES NFR BLD AUTO: 31.3 % (ref 19.6–45.3)
MCH RBC QN AUTO: 33 PG (ref 26.6–33)
MCHC RBC AUTO-ENTMCNC: 34.1 G/DL (ref 31.5–35.7)
MCV RBC AUTO: 96.8 FL (ref 79–97)
MONOCYTES # BLD AUTO: 0.4 10*3/MM3 (ref 0.1–0.9)
MONOCYTES NFR BLD AUTO: 7.5 % (ref 5–12)
NEUTROPHILS NFR BLD AUTO: 3.09 10*3/MM3 (ref 1.7–7)
NEUTROPHILS NFR BLD AUTO: 58.4 % (ref 42.7–76)
NRBC BLD AUTO-RTO: 0 /100 WBC (ref 0–0.2)
PLATELET # BLD AUTO: 223 10*3/MM3 (ref 140–450)
PMV BLD AUTO: 11.4 FL (ref 6–12)
RBC # BLD AUTO: 4.09 10*6/MM3 (ref 3.77–5.28)
T4 SERPL-MCNC: 6.31 MCG/DL (ref 4.5–11.7)
TSH SERPL DL<=0.05 MIU/L-ACNC: 1.29 UIU/ML (ref 0.27–4.2)
WBC NRBC COR # BLD AUTO: 5.3 10*3/MM3 (ref 3.4–10.8)

## 2025-02-06 PROCEDURE — 86235 NUCLEAR ANTIGEN ANTIBODY: CPT | Performed by: STUDENT IN AN ORGANIZED HEALTH CARE EDUCATION/TRAINING PROGRAM

## 2025-02-06 PROCEDURE — 99214 OFFICE O/P EST MOD 30 MIN: CPT | Performed by: STUDENT IN AN ORGANIZED HEALTH CARE EDUCATION/TRAINING PROGRAM

## 2025-02-06 PROCEDURE — 85025 COMPLETE CBC W/AUTO DIFF WBC: CPT | Performed by: STUDENT IN AN ORGANIZED HEALTH CARE EDUCATION/TRAINING PROGRAM

## 2025-02-06 PROCEDURE — 1159F MED LIST DOCD IN RCRD: CPT | Performed by: STUDENT IN AN ORGANIZED HEALTH CARE EDUCATION/TRAINING PROGRAM

## 2025-02-06 PROCEDURE — 84436 ASSAY OF TOTAL THYROXINE: CPT | Performed by: STUDENT IN AN ORGANIZED HEALTH CARE EDUCATION/TRAINING PROGRAM

## 2025-02-06 PROCEDURE — 1126F AMNT PAIN NOTED NONE PRSNT: CPT | Performed by: STUDENT IN AN ORGANIZED HEALTH CARE EDUCATION/TRAINING PROGRAM

## 2025-02-06 PROCEDURE — 86225 DNA ANTIBODY NATIVE: CPT | Performed by: STUDENT IN AN ORGANIZED HEALTH CARE EDUCATION/TRAINING PROGRAM

## 2025-02-06 PROCEDURE — 86140 C-REACTIVE PROTEIN: CPT | Performed by: STUDENT IN AN ORGANIZED HEALTH CARE EDUCATION/TRAINING PROGRAM

## 2025-02-06 PROCEDURE — 1160F RVW MEDS BY RX/DR IN RCRD: CPT | Performed by: STUDENT IN AN ORGANIZED HEALTH CARE EDUCATION/TRAINING PROGRAM

## 2025-02-06 PROCEDURE — 82550 ASSAY OF CK (CPK): CPT | Performed by: STUDENT IN AN ORGANIZED HEALTH CARE EDUCATION/TRAINING PROGRAM

## 2025-02-06 PROCEDURE — 86038 ANTINUCLEAR ANTIBODIES: CPT | Performed by: STUDENT IN AN ORGANIZED HEALTH CARE EDUCATION/TRAINING PROGRAM

## 2025-02-06 PROCEDURE — 84443 ASSAY THYROID STIM HORMONE: CPT | Performed by: STUDENT IN AN ORGANIZED HEALTH CARE EDUCATION/TRAINING PROGRAM

## 2025-02-06 RX ORDER — CLOTRIMAZOLE 1 %
1 CREAM (GRAM) TOPICAL 2 TIMES DAILY
Qty: 14 G | Refills: 0 | Status: SHIPPED | OUTPATIENT
Start: 2025-02-06 | End: 2025-02-13

## 2025-02-06 RX ORDER — HYDROXYZINE HYDROCHLORIDE 25 MG/1
25 TABLET, FILM COATED ORAL NIGHTLY PRN
Qty: 90 TABLET | Refills: 3 | Status: SHIPPED | OUTPATIENT
Start: 2025-02-06

## 2025-02-06 NOTE — PROGRESS NOTES
Follow Up Office Visit      Date: 2025   Patient Name: Hilda Herrera  : 1990   MRN: [id number]     Chief Complaint:    Chief Complaint   Patient presents with    Fatigue     Answers submitted by the patient for this visit:  Anxiety (Submitted on 2025)  Chief Complaint: Anxiety  Visit: follow-up  Frequency: most days  Severity: mild  dry mouth: No  excessive worry: Yes  insomnia: Yes  irritability: No  malaise/fatigue: Yes  obsessions: No  Sleep quality: fair  Aggravated by: social activities, work stress  Medication compliance: %  Side effects: fatigue      History of Present Illness: Hilda Herrera is a 34 y.o. female who is here today to follow up with chronic care management.  Her mother is an independent historian.    History of Present Illness  The patient is a 34-year-old female who presents for chronic care management.    Heightened Fear and Anxiety  She reports experiencing heightened fear, particularly when sleeping alone downstairs. She has been experiencing panic attacks, although these have not occurred recently. Her anxiety levels fluctuate, with more good days than bad.  - Onset: Not specified.  - Location: Not specified.  - Duration: Not specified.  - Character: Heightened fear, panic attacks, fluctuating anxiety levels.  - Timing: More good days than bad.  - Severity: Not specified.    Significant Fatigue  She has been experiencing significant fatigue over the past few weeks, which has been consistent and noticeable.  - Onset: Past few weeks.  - Duration: Consistent and noticeable.  - Character: Significant fatigue.  - Severity: Not specified.    Low-Grade Fever  She experienced a low-grade fever yesterday, which she attributes to her arthritis and the weather conditions.  - Onset: Yesterday.  - Character: Low-grade fever.  - Severity: Attributed to arthritis and weather conditions.    Sleep Issues  She typically goes to bed between 8:30 and 10:00 PM, often feeling  extremely tired, and wakes up around 6:45 to 7:00 AM. She has been observed playing games on her phone at 3:30 AM on one occasion, but she no longer keeps her phone in her room. She reports no snoring but admits to teeth grinding and uses a teeth guard.  - Onset: Not specified.  - Duration: Not specified.  - Character: Extremely tired, teeth grinding.  - Alleviating Factors: No longer keeps phone in her room, uses a teeth guard.  - Timing: Goes to bed between 8:30 and 10:00 PM, wakes up around 6:45 to 7:00 AM.  - Severity: Not specified.    Menstrual Cycle  She has had two normal menstrual cycles after a period of irregularity.  - Onset: After a period of irregularity.  - Character: Normal menstrual cycles.  - Severity: Not specified.    Medications  She is currently on Lexapro 20 mg, which appears to be managing her symptoms effectively. She takes hydroxyzine at night, which aids her sleep.    Esophageal Stricture  She has completed speech therapy for an esophageal stricture and has been advised to take her medications with apple sauce, which has improved her condition. She is also undergoing occupational therapy. She has been experiencing difficulty chewing certain foods and has been diagnosed with lax muscles and tongue by her dentist.  - Onset: Not specified.  - Character: Difficulty chewing certain foods, lax muscles and tongue.  - Alleviating Factors: Taking medications with apple sauce, speech therapy, occupational therapy.  - Severity: Improved condition.    Memory Issues  She has been having more good days with her memory since starting donepezil, although she still experiences occasional forgetful days. She feels that her medication is effective and is scheduled to see her neurologist this month.  - Onset: Since starting donepezil.  - Character: More good days with memory, occasional forgetful days.  - Severity: Feels medication is effective.    Foot Abnormalities  She has been using a brace for her foot  abnormalities, which has been working well. She has calluses on her feet, which have been shaved off several times by her podiatrist. She has a new callus on her big toe, which has not always been present.  - Onset: Not specified.  - Location: Feet, big toe.  - Character: Calluses, new callus on big toe.  - Alleviating Factors: Using a brace, calluses shaved off by podiatrist.  - Severity: Brace working well.    Intermittent Rashes  She has been experiencing intermittent rashes on her arms, which appear after showering and resolve by the next morning. She has been applying apple cider vinegar to the rash.  - Onset: After showering.  - Location: Arms.  - Duration: Resolve by the next morning.  - Character: Intermittent rashes.  - Alleviating Factors: Applying apple cider vinegar.  - Severity: Not specified.    FAMILY HISTORY  The patient has a family history of thyroid issues.    MEDICATIONS  - Lexapro  - Hydroxyzine  - Donepezil      Subjective      Review of Systems:   Review of Systems   Respiratory:  Negative for shortness of breath.    Cardiovascular:  Negative for chest pain and palpitations.   Gastrointestinal:  Negative for nausea.   Neurological:  Negative for dizziness and confusion.   Psychiatric/Behavioral:  Negative for depressed mood.        I have reviewed the patients family history, social history, past medical history, past surgical history and have updated it as appropriate.     Medications:     Current Outpatient Medications:     donepezil (Aricept) 5 MG tablet, Take 1 tablet by mouth Every Night., Disp: 30 tablet, Rfl: 6    Enbrel SureClick 50 MG/ML solution auto-injector, Inject 50 mg under the skin into the appropriate area as directed Every 7 (Seven) Days., Disp: , Rfl:     escitalopram (Lexapro) 20 MG tablet, Take 1 tablet by mouth Daily., Disp: 30 tablet, Rfl: 2    hydrOXYzine (ATARAX) 25 MG tablet, Take 1 tablet by mouth At Night As Needed for Anxiety (insomnia)., Disp: 90 tablet, Rfl: 3     pantoprazole (PROTONIX) 40 MG EC tablet, Take 1 tablet by mouth once daily, Disp: 30 tablet, Rfl: 0    clotrimazole (LOTRIMIN) 1 % cream, Apply 1 Application topically to the appropriate area as directed 2 (Two) Times a Day for 7 days., Disp: 14 g, Rfl: 0    Allergies:   Allergies   Allergen Reactions    Penicillins Rash    Cephalexin Rash and Unknown (See Comments)    Codeine Nausea And Vomiting and Unknown (See Comments)       Objective     Physical Exam: Please see above  Vital Signs:   Vitals:    02/06/25 1315   BP: 108/64   BP Location: Right arm   Patient Position: Sitting   Cuff Size: Adult   Pulse: 74   Resp: 18   Temp: 97.3 °F (36.3 °C)   TempSrc: Temporal   Weight: 68.7 kg (151 lb 6.4 oz)   PainSc: 0-No pain     Body mass index is 35.19 kg/m².          Physical Exam  Vitals and nursing note reviewed.   Constitutional:       General: She is not in acute distress.     Appearance: She is normal weight. She is not ill-appearing or toxic-appearing.   HENT:      Nose: No congestion or rhinorrhea.   Eyes:      General:         Right eye: No discharge.         Left eye: No discharge.      Conjunctiva/sclera: Conjunctivae normal.   Pulmonary:      Effort: Pulmonary effort is normal. No respiratory distress.   Abdominal:      General: Abdomen is flat. There is no distension.   Musculoskeletal:      Cervical back: Normal range of motion.   Skin:     Coloration: Skin is not jaundiced.      Findings: Lesion (see below, foot callus) present. No rash.   Neurological:      General: No focal deficit present.      Mental Status: She is alert. Mental status is at baseline.      Coordination: Coordination normal.      Gait: Gait normal.   Psychiatric:         Mood and Affect: Mood normal.         Behavior: Behavior normal.         Thought Content: Thought content normal.         Judgment: Judgment normal.         Procedures    Results:   Results      Labs:   Hemoglobin A1C   Date Value Ref Range Status   11/06/2024 5.2 4.5 -  5.7 % Final     TSH   Date Value Ref Range Status   11/06/2024 0.980 0.270 - 4.200 uIU/mL Final        Imaging:   No valid procedures specified.     Assessment / Plan      Assessment/Plan:   Problem List Items Addressed This Visit       Esophageal stricture - Primary    Overview     Due for dilation every 2 years  - next due in 2026         Relevant Orders    FL Video Swallow With Speech Single Contrast (Completed)    Anxiety    Memory loss    Abnormal uterine bleeding    Congenital foot abnormality    Relevant Orders    Ambulatory Referral to Podiatry (Completed)    Foot callus    Overview              Relevant Medications    clotrimazole (LOTRIMIN) 1 % cream    Other Relevant Orders    Ambulatory Referral to Podiatry (Completed)     Other Visit Diagnoses       Chronic fatigue        Relevant Orders    TSH    C-reactive Protein    CK    CBC Auto Differential    ANABELLE by IFA, Reflex 9-biomarkers profile    Insomnia due to other mental disorder        Relevant Medications    hydrOXYzine (ATARAX) 25 MG tablet    Tinea        Relevant Medications    clotrimazole (LOTRIMIN) 1 % cream            Assessment & Plan  1. Chronic care management  - Persistent fatigue over the past few weeks  - Increased risk of thyroid dysfunction due to Down syndrome  - Last thyroid function test conducted three months ago  - Possibility of anemia considered due to previously irregular menstrual cycles  - Comprehensive workup initiated today: CBC, TSH, CK value, metabolic panel, generalized inflammatory marker, and generalized autoimmune marker  - Medication refill provided    2. Esophageal stricture  - Completed speech therapy with improvement in swallowing  - No current issues of food getting stuck  - EGD scheduled for next year as part of routine follow-up    3. Memory issues  - Currently on donepezil with more good days than bad  - Feels the medication is effective  - Will continue current regimen and follow up with neurology as  scheduled    4. Foot abnormalities  - Using a brace with new hardware, reports it is working well  - Calluses on feet, shaved off several times  - Referral to podiatry made for further management  - Over-the-counter PowerStep insoles recommended for additional support    5. Tinea infection  - Rash appears intermittently, likely ringworm  - Clotrimazole prescribed to be applied twice daily for a week  - If rash does not improve, a topical steroid may be added    Follow-up  - Patient will follow up in 3 months    Follow Up:   Return in about 3 months (around 5/6/2025) for Recheck.        Patient or patient representative verbalized consent for the use of Ambient Listening during the visit with  Ralph Newman MD for chart documentation. 2/6/2025  13:41 EST    Ralph Newman MD  [facility] MARTHA Simmons

## 2025-02-06 NOTE — PATIENT INSTRUCTIONS
Dentistry    Address: [address] [place] [address]    Accepts Medicaid    Dental: Phone [phone], Fax [phone]    Oral Surgery: Phone [phone], Fax [phone]    Facial Pain Clinic: Phone [phone], Fax [phone]    Periodontal: Phone [phone]      [facility]    Address: [address], [place] [address]    Accepts Medicaid    Phone: [phone]      [facility]    Address: [address] [place] [address] or [address] [place] [address]    Walk-in, arrive by 8 AM (5-6 patients a day)    [facility]    Address: [address] [facility] [address] or [facility]    Phone: [phone]      Pediatric Dent[facility] Address: [address] , [address], [place] [address]    [place] Address: [address] [place] [address]    Locations in [facility]    Phone: [phone]      [place] [facility]    Address: [address] [place] [address]    Phone: [phone]      [facility]    Address: [address], [place] [address]    Accepts [employer], [employer], and [employer] Medicaid    Phone: [phone]      [facility]    Accepts [employer] and [employer] Medicaid    Address: [address] [id number]    Phone: [phone]      [facility]    Address: [address] , [address], [place] [address]    Phone: [phone]      [facility]    Address: [address] [place] [address]    Can see patients for tongue ties    Phone: [phone]

## 2025-02-07 RX ORDER — ESCITALOPRAM OXALATE 20 MG/1
20 TABLET ORAL DAILY
Qty: 30 TABLET | Refills: 0 | Status: SHIPPED | OUTPATIENT
Start: 2025-02-07

## 2025-02-07 NOTE — TELEPHONE ENCOUNTER
Rx Refill Note  Requested Prescriptions     Pending Prescriptions Disp Refills    escitalopram (LEXAPRO) 20 MG tablet [Pharmacy Med Name: Escitalopram Oxalate 20 MG Oral Tablet] 30 tablet 0     Sig: Take 1 tablet by mouth once daily      Last filled:  11/13/24 w/2  Last office visit with prescribing clinician: 11/13/2024      Next office visit with prescribing clinician: 2/18/2025     Humera Gardiner MA  02/07/25, 10:55 EST

## 2025-02-11 LAB
ANA HOMOGEN TITR SER: NORMAL {TITER}
ANA SER QL IF: POSITIVE
CENTROMERE B AB SER-ACNC: <0.2 AI (ref 0–0.9)
CHROMATIN AB SERPL-ACNC: <0.2 AI (ref 0–0.9)
DSDNA AB SER-ACNC: <1 IU/ML (ref 0–9)
ENA JO1 AB SER-ACNC: <0.2 AI (ref 0–0.9)
ENA RNP AB SER-ACNC: 0.2 AI (ref 0–0.9)
ENA SCL70 AB SER-ACNC: <0.2 AI (ref 0–0.9)
ENA SM AB SER-ACNC: <0.2 AI (ref 0–0.9)
ENA SS-A AB SER-ACNC: <0.2 AI (ref 0–0.9)
ENA SS-B AB SER-ACNC: <0.2 AI (ref 0–0.9)
LABORATORY COMMENT REPORT: ABNORMAL
Lab: NORMAL
Lab: NORMAL

## 2025-02-14 PROBLEM — R53.82 CHRONIC FATIGUE: Status: ACTIVE | Noted: 2025-02-14

## 2025-02-18 ENCOUNTER — OFFICE VISIT (OUTPATIENT)
Dept: NEUROLOGY | Facility: CLINIC | Age: 35
End: 2025-02-18
Payer: MEDICAID

## 2025-02-18 VITALS
DIASTOLIC BLOOD PRESSURE: 80 MMHG | HEIGHT: 55 IN | WEIGHT: 147 LBS | BODY MASS INDEX: 34.02 KG/M2 | SYSTOLIC BLOOD PRESSURE: 118 MMHG | OXYGEN SATURATION: 99 % | HEART RATE: 57 BPM

## 2025-02-18 DIAGNOSIS — F41.9 ANXIETY: ICD-10-CM

## 2025-02-18 DIAGNOSIS — Q90.9 DOWN SYNDROME: ICD-10-CM

## 2025-02-18 DIAGNOSIS — R41.3 MEMORY CHANGE: Primary | ICD-10-CM

## 2025-02-18 DIAGNOSIS — Z82.0 FAMILY HISTORY OF ALZHEIMER'S DISEASE: ICD-10-CM

## 2025-02-18 PROCEDURE — 99213 OFFICE O/P EST LOW 20 MIN: CPT | Performed by: NURSE PRACTITIONER

## 2025-02-18 PROCEDURE — 1160F RVW MEDS BY RX/DR IN RCRD: CPT | Performed by: NURSE PRACTITIONER

## 2025-02-18 PROCEDURE — 1159F MED LIST DOCD IN RCRD: CPT | Performed by: NURSE PRACTITIONER

## 2025-02-18 RX ORDER — DONEPEZIL HYDROCHLORIDE 5 MG/1
5 TABLET, FILM COATED ORAL NIGHTLY
Qty: 30 TABLET | Refills: 6 | Status: SHIPPED | OUTPATIENT
Start: 2025-02-18 | End: 2026-02-18

## 2025-02-18 RX ORDER — ESCITALOPRAM OXALATE 20 MG/1
20 TABLET ORAL DAILY
Qty: 30 TABLET | Refills: 0 | Status: SHIPPED | OUTPATIENT
Start: 2025-02-18

## 2025-02-18 NOTE — PROGRESS NOTES
Neuro Office Visit      Encounter Date: 2025   Patient Name: Hilda Herrera  : 1990   MRN: [id number]   PCP:  Ralph Newman MD     Chief Complaint:    Chief Complaint   Patient presents with    Memory change       History of Present Illness: Hilda Herrera is a 34 y.o. female who is here today in Neurology memory loss.    Last visit with me on 2024, continued donepezil, increase Lexapro.    History of Present Illness  Mom reports that Hilda has heightened anxiety when not at work, leading to emotional breakdowns.     Hilda has severe separation anxiety and mom has moved Benji'a bedroom closer because she had difficulty getting Hilda to sleep in her room downstairs.     Hilda will be attending a [employer] camp again this year, which she enjoys and where she has a strong social network.     Positive response to an increased dose of Lexapro, with a decrease in some symptoms. Currently on hydroxyzine as needed. Considering a consultation with Dr. Gonzalez, a psychiatrist at [facility], and preparing to initiate behavioral support.    Suspected onset of sleep disturbances, appearing restless during sleep and struggling to quiet the mind, though able to fall asleep without difficulty.    Memory remains relatively stable, with more good days than bad. Instances of confusion are being managed effectively. A recent brain scan did not reveal any significant findings.          SOCIAL HISTORY    Sleep: Reports having some sleep issues, including tossing and turning and difficulty turning the mind off to get to sleep.  Screen Time: Watches TV.    ALLERGIES  The patient has no known allergies.    MEDICATIONS  CURRENT MEDS:  Lexapro  Hydroxyzine      Subjective      Past Medical History:   Past Medical History:   Diagnosis Date    Anxiety     Arthritis     Dementia     Developmental delay Birth    Difficulty walking     on uneven ground and sand    Down syndrome     Family history of Alzheimer's disease  04/03/2024    Headache, tension-type     every now and then    Heart murmur     Memory loss     I see signs of small things    Migraine     Hormonal with periods    Shingles 1994       Past Surgical History:   Past Surgical History:   Procedure Laterality Date    CARDIAC SURGERY      valve replaced at 15 months old    CARDIAC VALVE REPLACEMENT  1991    AVSD    CHOLECYSTECTOMY      FOOT SURGERY Right     FOOT SURGERY Right     FOOT SURGERY Right     TONSILLECTOMY         Family History:   Family History   Problem Relation Age of Onset    Migraines Mother         Have had them since teenage years    Neuropathy Mother         Legs    Dementia Father     Dementia Maternal Grandmother        Social History:   Social History     Socioeconomic History    Marital status: Single   Tobacco Use    Smoking status: Never     Passive exposure: Never    Smokeless tobacco: Never   Vaping Use    Vaping status: Never Used   Substance and Sexual Activity    Alcohol use: Never    Drug use: Never    Sexual activity: Never       Medications:     Current Outpatient Medications:     donepezil (Aricept) 5 MG tablet, Take 1 tablet by mouth Every Night., Disp: 30 tablet, Rfl: 6    Enbrel SureClick 50 MG/ML solution auto-injector, Inject 50 mg under the skin into the appropriate area as directed Every 7 (Seven) Days., Disp: , Rfl:     escitalopram (LEXAPRO) 20 MG tablet, Take 1 tablet by mouth Daily., Disp: 30 tablet, Rfl: 0    hydrOXYzine (ATARAX) 25 MG tablet, Take 1 tablet by mouth At Night As Needed for Anxiety (insomnia)., Disp: 90 tablet, Rfl: 3    pantoprazole (PROTONIX) 40 MG EC tablet, Take 1 tablet by mouth once daily, Disp: 30 tablet, Rfl: 0    Allergies:   Allergies   Allergen Reactions    Penicillins Rash    Cephalexin Rash and Unknown (See Comments)    Codeine Nausea And Vomiting and Unknown (See Comments)       PHQ-9 Total Score:     STEADI Fall Risk Assessment has not been completed.    Objective     Physical Exam:  "    Neurological Exam  Mental Status  Awake, alert and oriented to person, place and time. Recent and remote memory are intact. Speech is normal. Language is fluent with no aphasia. Attention and concentration are normal. Fund of knowledge is appropriate for level of education.    Cranial Nerves  CN II: Visual acuity is normal.  CN III, IV, VI: Extraocular movements intact bilaterally. Pupils equal round and reactive to light bilaterally.  CN V: Facial sensation is normal.  CN VII: Full and symmetric facial movement.  CN IX, X: Palate elevates symmetrically  CN XI: Shoulder shrug strength is normal.  CN XII: Tongue midline without atrophy or fasciculations.    Motor  Normal muscle bulk throughout. No fasciculations present. Normal muscle tone. Strength is 5/5 throughout all four extremities.    Sensory  Sensation is intact to light touch, pinprick, vibration and proprioception in all four extremities.    Reflexes                                            Right                      Left  Brachioradialis                    2+                         2+  Biceps                                 2+                         2+  Triceps                                2+                         2+  Finger flex                           2+                         2+  Hamstring                            2+                         2+  Patellar                                2+                         2+  Achilles                                2+                         2+    Coordination    Finger-to-nose, rapid alternating movements and heel-to-shin normal bilaterally without dysmetria.    Gait  Normal casual, toe, heel and tandem gait.        Vital Signs:   Vitals:    02/18/25 0834   BP: 118/80   Pulse: 57   SpO2: 99%   Weight: 66.7 kg (147 lb)   Height: 139.7 cm (55\")     Body mass index is 34.17 kg/m².     Results:   Results  Radiology: Brain scan, no acute changes or white matter, ventricles are of normal size, and brain " "volume is good.           Imaging:   FL Video Swallow With Speech Single Contrast    Result Date: 12/17/2024  Impression: Fluoroscopy provided for a modified barium swallow. No aspiration was seen during swallowing evaluation. Please see speech therapy report for full details and recommendations. 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 Findings are consistent with provided history and may be chronic and of uncertain clinical significance. Otherwise normal contrast-enhanced MRI of the brain. Electronically Signed: Andrés Perez MD  12/10/2024 5:05 PM EST  Workstation ID: [id number]       Labs:   No results found for: \"CMP\", \"PROTEIN\", \"ANTIMOGAB\", \"IKNXBV6PBGJ\", \"JCVRESULT\", \"QUANTTBGOLD\", \"CBCDIF\", \"IGGALBSER\"     Assessment / Plan      Assessment/Plan:   Diagnoses and all orders for this visit:    1. Memory change (Primary)  Comments:  Continue donepezil    2. Anxiety  Comments:  Continue Lexapro    3. Family history of Alzheimer's disease    4. Down syndrome    Other orders  -     donepezil (Aricept) 5 MG tablet; Take 1 tablet by mouth Every Night.  Dispense: 30 tablet; Refill: 6  -     escitalopram (LEXAPRO) 20 MG tablet; Take 1 tablet by mouth Daily.  Dispense: 30 tablet; Refill: 0         Assessment & Plan  1. Anxiety.  Her anxiety levels increase significantly when her mother is away. She has shown some improvement with the increased dose of Lexapro. The potential " benefits and risks associated with Lecanemab and Cenezumab were discussed, but it was decided not to pursue these treatments at this time due to their limited stability period and high cost. She is currently taking Lexapro and hydroxyzine. She will continue with her current medication regimen. A referral to Dr. Kristan Gonzalez, a psychiatrist at [facility], was suggested for further management of her anxiety.    2. Sleep issues.  She is experiencing sleep disturbances, including tossing and turning and difficulty turning her mind off to sleep. She will continue with her current medication regimen, including Lexapro and hydroxyzine, which may help with her sleep issues.    3. Memory issues.  Her memory remains relatively stable with more good days than bad ones. The recent brain scan did not show any acute changes. She will continue with her current medication regimen and monitoring.    Follow-up  The patient is scheduled for a follow-up visit in 08/2025.    Patient Education:     Reviewed medications, potential side effects and signs and symptoms to report. Discussed risk versus benefits of treatment plan with patient and/or family-including medications, labs and radiology that may be ordered. Addressed questions and concerns during visit. Patient and/or family verbalized understanding and agree with plan. Instructed to call the office with any questions and report to ER with any life-threatening symptoms.     Follow Up:   No follow-ups on file.    I spent 30 minutes caring for Hilda on this date of service. This time includes time spent by me in the following activities: preparing for the visit, performing a medically appropriate examination and/or evaluation, counseling and educating the patient/family/caregiver, and documenting information in the medical record.        During this visit the following were done:  Labs Reviewed []    Labs Ordered []    Radiology Reports Reviewed []    Radiology Ordered []    PCP Records  Reviewed []    Referring Provider Records Reviewed []    ER Records Reviewed []    Hospital Records Reviewed []    History Obtained From Family []    Radiology Images Reviewed []    Other Reviewed []    Records Requested []      Patient or patient representative verbalized consent for the use of Ambient Listening during the visit with  RACHAEL Mc for chart documentation. 2/18/2025  08:37 EST    RACHAEL Mc   [facility]  [address] [place] [id number] [phone]

## 2025-04-29 RX ORDER — ESCITALOPRAM OXALATE 20 MG/1
20 TABLET ORAL DAILY
Qty: 30 TABLET | Refills: 2 | Status: SHIPPED | OUTPATIENT
Start: 2025-04-29

## 2025-04-29 NOTE — TELEPHONE ENCOUNTER
Rx Refill Note  Requested Prescriptions     Pending Prescriptions Disp Refills    escitalopram (LEXAPRO) 20 MG tablet [Pharmacy Med Name: Escitalopram Oxalate 20 MG Oral Tablet] 30 tablet 0     Sig: Take 1 tablet by mouth once daily      Last filled: 02/18/2025 30 with 0 refills   Last office visit with prescribing clinician: 2/18/2025      Next office visit with prescribing clinician: 8/5/2025     Kati Hussein MA  04/29/25, 08:07 EDT

## 2025-05-06 ENCOUNTER — OFFICE VISIT (OUTPATIENT)
Dept: INTERNAL MEDICINE | Facility: CLINIC | Age: 35
End: 2025-05-06
Payer: MEDICAID

## 2025-05-06 VITALS
BODY MASS INDEX: 33.7 KG/M2 | WEIGHT: 145 LBS | HEART RATE: 74 BPM | RESPIRATION RATE: 18 BRPM | TEMPERATURE: 98.4 F | SYSTOLIC BLOOD PRESSURE: 108 MMHG | DIASTOLIC BLOOD PRESSURE: 74 MMHG

## 2025-05-06 DIAGNOSIS — F41.9 ANXIETY: ICD-10-CM

## 2025-05-06 DIAGNOSIS — K04.8: ICD-10-CM

## 2025-05-06 DIAGNOSIS — R53.82 CHRONIC FATIGUE: Primary | ICD-10-CM

## 2025-05-06 DIAGNOSIS — N93.9 ABNORMAL UTERINE BLEEDING: ICD-10-CM

## 2025-05-06 DIAGNOSIS — L84 FOOT CALLUS: ICD-10-CM

## 2025-05-06 PROCEDURE — 1160F RVW MEDS BY RX/DR IN RCRD: CPT | Performed by: STUDENT IN AN ORGANIZED HEALTH CARE EDUCATION/TRAINING PROGRAM

## 2025-05-06 PROCEDURE — 1126F AMNT PAIN NOTED NONE PRSNT: CPT | Performed by: STUDENT IN AN ORGANIZED HEALTH CARE EDUCATION/TRAINING PROGRAM

## 2025-05-06 PROCEDURE — 1159F MED LIST DOCD IN RCRD: CPT | Performed by: STUDENT IN AN ORGANIZED HEALTH CARE EDUCATION/TRAINING PROGRAM

## 2025-05-06 PROCEDURE — 99214 OFFICE O/P EST MOD 30 MIN: CPT | Performed by: STUDENT IN AN ORGANIZED HEALTH CARE EDUCATION/TRAINING PROGRAM
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
## 2025-05-16 NOTE — PROGRESS NOTES
Chief Complaint  Fatigue, Unable To Stay Asleep, Frequent Awakenings, and grind teeth    Subjective     History of Present Illness:  Hilda Herrera is a 34 y.o. female with Down syndrome, heart murmur, and migraines.  The patient is referred by Ralph Newman MD with primary care.  Patient has had difficulty with fatigue.  It is noted that she has had some difficulty with insomnia which appears to be related to anxiety.  This is also noted to be situational in nature.  Patient is to see a behavioral specialist.  Hydroxyzine was prescribed and noted to be helpful in her sleep issues.  Patient's accompanied by her mother.  Review of self-reported questionnaire notes symptoms including frequent awakening, nasal allergies, grinding teeth, difficulty staying asleep, memory loss, and lack of concentration.  Patient typically goes to bed at 9-10 p.m. waking at 645-7 AM on weekdays, and 10-10:30 PM waking at 8-11 a.m. on weekends.  The patient estimates an average of 7 hours of sleep per night and it does not take long for her to get to sleep.  Patient denies use of tobacco, alcohol, or illicit/recreational drugs.  She drinks 1 decaf cola per week.     Further details are as follows:    [place] Scale is (out of 24): Total score: 4     Estimated average amount of sleep per night: 7 hours  Number of times she wakes up at night: 3-4 times  Difficulty falling back asleep: yes  It usually takes a few minutes to go to sleep.  She feels sleepy upon waking up: no  Rotating or night shift work: no    Drowsiness/Sleepiness:  She exhibits the following:  excessive daytime fatigue  falls asleep watching TV    Snoring/Breathing:  She exhibits the following:  loud snoring, quits breathing at night, awakens with dry mouth, and awakens with coughing    Head Injury:  She exhibits the following:  No    Reflux:  She describes the following:  wakes up at night with a sour taste or burning sensation in chest  takes medication for  reflux    Narcolepsy:  She exhibits the following:  none    RLS/PLMs:  She describes the following:  none    Insomnia:  She describes the following:  frequent awakenings    Parasomnia:  She exhibits the following:  sleep talks  grinds teeth    Weight:       05/21/25  1135   Weight: 64.9 kg (143 lb)      Weight change in the last year:  loss: 25 lbs    The patient's relevant past medical, surgical, family, and social history reviewed and updated in Epic as appropriate.    Review of Systems   Constitutional:  Positive for activity change and appetite change.   HENT:  Positive for dental problem and trouble swallowing.    Eyes: Negative.    Respiratory:  Positive for choking.    Gastrointestinal:  Positive for constipation.   Endocrine: Negative.    Genitourinary: Negative.    Musculoskeletal:  Positive for joint swelling and neck stiffness.   Skin:  Positive for rash.   Allergic/Immunologic: Negative.    Neurological:  Positive for speech difficulty, headache and confusion.   Hematological: Negative.    Psychiatric/Behavioral:  Positive for sleep disturbance. The patient is nervous/anxious.        PMH:    Past Medical History:   Diagnosis Date    Anxiety     Arthritis     Dementia     Depression     Developmental delay Birth    Difficulty walking     on uneven ground and sand    Down syndrome     Family history of Alzheimer's disease 04/03/2024    Headache, tension-type     every now and then    Heart murmur     Memory loss     I see signs of small things    Migraine     Hormonal with periods    Shingles 1994     Past Surgical History:   Procedure Laterality Date    CARDIAC SURGERY      valve replaced at 15 months old    CARDIAC VALVE REPLACEMENT  1991    [facility]    CHOLECYSTECTOMY      FOOT SURGERY Right     FOOT SURGERY Right     FOOT SURGERY Right     TONSILLECTOMY       OB History    No obstetric history on file.       Allergies   Allergen Reactions    Penicillins Rash    Cephalexin Rash and Unknown (See Comments)     "Codeine Nausea And Vomiting and Unknown (See Comments)       MEDS:  Prior to Admission medications    Medication Sig Start Date End Date Taking? Authorizing Provider   donepezil (Aricept) 5 MG tablet Take 1 tablet by mouth Every Night. 2/18/25 2/18/26  Fiona Kapadia APRN   Enbrel SureClick 50 MG/ML solution auto-injector Inject 50 mg under the skin into the appropriate area as directed Every 7 (Seven) Days. 3/27/24   ProviderBetzy MD   escitalopram (LEXAPRO) 20 MG tablet Take 1 tablet by mouth once daily 4/29/25   Fiona Kapadia APRN   hydrOXYzine (ATARAX) 25 MG tablet Take 1 tablet by mouth At Night As Needed for Anxiety (insomnia). 2/6/25   Ralph Newman MD   pantoprazole (PROTONIX) 40 MG EC tablet Take 1 tablet by mouth once daily 9/23/24   Jordana Steward PA-C       FH:  Family History   Problem Relation Age of Onset    Migraines Mother         Have had them since teenage years    Neuropathy Mother         Legs    Dementia Father     Dementia Maternal Grandmother        Objective   Vital Signs:  /76   Pulse 75   Temp 97.9 °F (36.6 °C) (Infrared)   Ht 139.7 cm (55\")   Wt 64.9 kg (143 lb)   SpO2 95%   BMI 33.24 kg/m²     Patient's (Body mass index is 33.24 kg/m².) indicates that they are obese (BMI >30) with health related conditions that include obstructive sleep apnea, hypertension, coronary heart disease, and diabetes mellitus . Weight is improving with lifestyle modifications. BMI is is above average; BMI management plan is completed. We discussed portion control and increasing exercise.         Physical Exam  Vitals reviewed.   Constitutional:       Appearance: Normal appearance.   HENT:      Head: Normocephalic and atraumatic.      Nose: Nose normal.      Mouth/Throat:      Mouth: Mucous membranes are moist.   Cardiovascular:      Rate and Rhythm: Normal rate and regular rhythm.      Heart sounds: No murmur heard.     No friction rub. No gallop.   Pulmonary:      " Effort: Pulmonary effort is normal. No respiratory distress.      Breath sounds: Normal breath sounds. No wheezing or rhonchi.   Neurological:      Mental Status: She is alert and oriented to person, place, and time.   Psychiatric:         Behavior: Behavior normal.       Mallampati Score: IV (only hard palate visible)    Result Review :              Assessment and Plan  Hilda Herrera is a very pleasant 34 y.o. female with a history of Down syndrome, heart murmur, and migraines who presents for further evaluation of excessive daytime sleepiness and fatigue, nonrestorative sleep, and concerns for sleep disordered breathing and obstructive sleep apnea.  Patient is accompanied by her mother.  The patient's symptoms, particularly snoring, are concerning for significant sleep disordered breathing and obstructive sleep apnea. We will obtain a home sleep test for further evaluation.  The patient will return for follow-up and recommendations after test.  I have discussed weight loss as it pertains to obstructive sleep apnea.    Diagnoses and all orders for this visit:    1. Sleep apnea, unspecified type (Primary)  -     Home Sleep Study; Future    2. Snoring  -     Home Sleep Study; Future    3. Excessive daytime sleepiness  -     Home Sleep Study; Future    4. Obesity (BMI 30.0-34.9)                 I discussed the consequences of uncontrolled sleep apnea including hypertension, heart disease, diabetes, stroke, and dementia. I further discussed sleep apnea therapeutic options including CPAP, Weight loss, Oral dental appliance, and surgery.         Follow Up  Return for Follow up after study.  Patient was given instructions and counseling regarding her condition or for health maintenance advice. Please see specific information pulled into the AVS if appropriate.     RACHAEL Cornell, ACNP-BC  Pulmonology, Critical Care, and Sleep Medicine

## 2025-05-21 ENCOUNTER — OFFICE VISIT (OUTPATIENT)
Dept: SLEEP MEDICINE | Age: 35
End: 2025-05-21
Payer: MEDICAID

## 2025-05-21 ENCOUNTER — PATIENT ROUNDING (BHMG ONLY) (OUTPATIENT)
Dept: SLEEP MEDICINE | Age: 35
End: 2025-05-21
Payer: MEDICAID

## 2025-05-21 VITALS
DIASTOLIC BLOOD PRESSURE: 76 MMHG | OXYGEN SATURATION: 95 % | SYSTOLIC BLOOD PRESSURE: 110 MMHG | HEIGHT: 55 IN | BODY MASS INDEX: 33.09 KG/M2 | TEMPERATURE: 97.9 F | WEIGHT: 143 LBS | HEART RATE: 75 BPM

## 2025-05-21 DIAGNOSIS — G47.19 EXCESSIVE DAYTIME SLEEPINESS: ICD-10-CM

## 2025-05-21 DIAGNOSIS — R06.83 SNORING: ICD-10-CM

## 2025-05-21 DIAGNOSIS — E66.811 OBESITY (BMI 30.0-34.9): ICD-10-CM

## 2025-05-21 DIAGNOSIS — G47.30 SLEEP APNEA, UNSPECIFIED TYPE: Primary | ICD-10-CM

## 2025-05-21 NOTE — PROGRESS NOTES
May 21, 2025    Hello, may I speak with Hilda Herrera?    My name is Rossy      I am  with [facility]  [address] [address] [phone].    Before we get started may I verify your date of birth? 1990    I am calling to officially welcome you to our practice and ask about your recent visit. Is this a good time to talk? yes    Tell me about your visit with us. What things went well?  very helpful good visit       We're always looking for ways to make our patients' experiences even better. Do you have recommendations on ways we may improve?  no    Overall were you satisfied with your first visit to our practice? yes       I appreciate you taking the time to speak with me today. Is there anything else I can do for you? no      Thank you, and have a great day.

## 2025-07-21 ENCOUNTER — HOSPITAL ENCOUNTER (OUTPATIENT)
Dept: SLEEP MEDICINE | Facility: HOSPITAL | Age: 35
Discharge: HOME | End: 2025-07-21
Admitting: NURSE PRACTITIONER
Payer: MEDICAID

## 2025-07-21 VITALS — BODY MASS INDEX: 33.11 KG/M2 | HEIGHT: 55 IN | WEIGHT: 143.08 LBS

## 2025-07-21 DIAGNOSIS — R06.83 SNORING: ICD-10-CM

## 2025-07-21 DIAGNOSIS — G47.30 SLEEP APNEA, UNSPECIFIED TYPE: ICD-10-CM

## 2025-07-21 DIAGNOSIS — G47.19 EXCESSIVE DAYTIME SLEEPINESS: ICD-10-CM

## 2025-07-21 PROCEDURE — G0399 HOME SLEEP TEST/TYPE 3 PORTA: HCPCS

## 2025-07-31 ENCOUNTER — TELEPHONE (OUTPATIENT)
Dept: SLEEP MEDICINE | Age: 35
End: 2025-07-31
Payer: MEDICAID

## 2025-07-31 RX ORDER — ESCITALOPRAM OXALATE 20 MG/1
20 TABLET ORAL DAILY
Qty: 30 TABLET | Refills: 3 | Status: SHIPPED | OUTPATIENT
Start: 2025-07-31

## 2025-07-31 NOTE — TELEPHONE ENCOUNTER
Rx Refill Note  Requested Prescriptions     Pending Prescriptions Disp Refills    escitalopram (LEXAPRO) 20 MG tablet [Pharmacy Med Name: Escitalopram Oxalate 20 MG Oral Tablet] 30 tablet 0     Sig: Take 1 tablet by mouth once daily      Last filled:4/29/25, 30 with 2 refills  Last office visit with prescribing clinician: 2/18/2025      Next office visit with prescribing clinician: 8/5/2025     Kati Hussein MA  07/31/25, 13:49 EDT

## 2025-08-05 ENCOUNTER — OFFICE VISIT (OUTPATIENT)
Facility: HOSPITAL | Age: 35
End: 2025-08-05
Payer: MEDICAID

## 2025-08-05 ENCOUNTER — TELEPHONE (OUTPATIENT)
Dept: SLEEP MEDICINE | Age: 35
End: 2025-08-05
Payer: MEDICAID

## 2025-08-05 VITALS
WEIGHT: 143 LBS | BODY MASS INDEX: 33.09 KG/M2 | DIASTOLIC BLOOD PRESSURE: 68 MMHG | OXYGEN SATURATION: 99 % | SYSTOLIC BLOOD PRESSURE: 118 MMHG | HEIGHT: 55 IN | HEART RATE: 54 BPM

## 2025-08-05 DIAGNOSIS — Q90.9 DOWN SYNDROME: ICD-10-CM

## 2025-08-05 DIAGNOSIS — F41.9 ANXIETY: ICD-10-CM

## 2025-08-05 DIAGNOSIS — R41.3 MEMORY CHANGE: Primary | ICD-10-CM

## 2025-08-05 PROCEDURE — G0463 HOSPITAL OUTPT CLINIC VISIT: HCPCS | Performed by: NURSE PRACTITIONER

## 2025-08-05 PROCEDURE — 99213 OFFICE O/P EST LOW 20 MIN: CPT | Performed by: NURSE PRACTITIONER

## 2025-08-05 PROCEDURE — 1159F MED LIST DOCD IN RCRD: CPT | Performed by: NURSE PRACTITIONER

## 2025-08-05 PROCEDURE — 1160F RVW MEDS BY RX/DR IN RCRD: CPT | Performed by: NURSE PRACTITIONER

## 2025-08-05 RX ORDER — DONEPEZIL HYDROCHLORIDE 5 MG/1
5 TABLET, FILM COATED ORAL NIGHTLY
Qty: 30 TABLET | Refills: 6 | Status: SHIPPED | OUTPATIENT
Start: 2025-08-05 | End: 2026-08-05

## 2025-08-05 RX ORDER — ESCITALOPRAM OXALATE 20 MG/1
20 TABLET ORAL DAILY
Qty: 30 TABLET | Refills: 3 | Status: SHIPPED | OUTPATIENT
Start: 2025-08-05

## 2025-08-07 ENCOUNTER — TELEPHONE (OUTPATIENT)
Dept: SLEEP MEDICINE | Age: 35
End: 2025-08-07
Payer: MEDICAID

## 2025-08-07 ENCOUNTER — OFFICE VISIT (OUTPATIENT)
Dept: INTERNAL MEDICINE | Facility: CLINIC | Age: 35
End: 2025-08-07
Payer: MEDICAID

## 2025-08-07 VITALS
HEART RATE: 80 BPM | RESPIRATION RATE: 18 BRPM | BODY MASS INDEX: 33.47 KG/M2 | TEMPERATURE: 98.9 F | WEIGHT: 144 LBS | SYSTOLIC BLOOD PRESSURE: 120 MMHG | DIASTOLIC BLOOD PRESSURE: 76 MMHG

## 2025-08-07 DIAGNOSIS — R05.9 COUGH, UNSPECIFIED TYPE: ICD-10-CM

## 2025-08-07 DIAGNOSIS — J20.9 ACUTE BRONCHITIS, UNSPECIFIED ORGANISM: Primary | ICD-10-CM

## 2025-08-07 LAB
EXPIRATION DATE: NORMAL
EXPIRATION DATE: NORMAL
FLUAV AG UPPER RESP QL IA.RAPID: NOT DETECTED
FLUBV AG UPPER RESP QL IA.RAPID: NOT DETECTED
INTERNAL CONTROL: NORMAL
INTERNAL CONTROL: NORMAL
Lab: NORMAL
Lab: NORMAL
S PYO AG THROAT QL: NEGATIVE
SARS-COV-2 AG UPPER RESP QL IA.RAPID: NOT DETECTED

## 2025-08-07 PROCEDURE — 1126F AMNT PAIN NOTED NONE PRSNT: CPT | Performed by: INTERNAL MEDICINE

## 2025-08-07 PROCEDURE — 87428 SARSCOV & INF VIR A&B AG IA: CPT | Performed by: INTERNAL MEDICINE

## 2025-08-07 PROCEDURE — 87880 STREP A ASSAY W/OPTIC: CPT | Performed by: INTERNAL MEDICINE

## 2025-08-07 PROCEDURE — 1160F RVW MEDS BY RX/DR IN RCRD: CPT | Performed by: INTERNAL MEDICINE

## 2025-08-07 PROCEDURE — 1159F MED LIST DOCD IN RCRD: CPT | Performed by: INTERNAL MEDICINE

## 2025-08-07 PROCEDURE — 99214 OFFICE O/P EST MOD 30 MIN: CPT | Performed by: INTERNAL MEDICINE

## 2025-08-07 RX ORDER — DOXYCYCLINE 100 MG/1
100 CAPSULE ORAL 2 TIMES DAILY
Qty: 20 CAPSULE | Refills: 0 | Status: SHIPPED | OUTPATIENT
Start: 2025-08-07 | End: 2025-08-17

## 2025-08-19 ENCOUNTER — OFFICE VISIT (OUTPATIENT)
Dept: INTERNAL MEDICINE | Facility: CLINIC | Age: 35
End: 2025-08-19
Payer: MEDICAID

## 2025-08-19 VITALS
WEIGHT: 145.6 LBS | DIASTOLIC BLOOD PRESSURE: 70 MMHG | BODY MASS INDEX: 33.84 KG/M2 | HEART RATE: 74 BPM | SYSTOLIC BLOOD PRESSURE: 108 MMHG | TEMPERATURE: 98.4 F | RESPIRATION RATE: 18 BRPM

## 2025-08-19 DIAGNOSIS — K04.8: ICD-10-CM

## 2025-08-19 DIAGNOSIS — R53.82 CHRONIC FATIGUE: ICD-10-CM

## 2025-08-19 DIAGNOSIS — J30.1 SEASONAL ALLERGIC RHINITIS DUE TO POLLEN: ICD-10-CM

## 2025-08-19 DIAGNOSIS — G47.33 OSA (OBSTRUCTIVE SLEEP APNEA): ICD-10-CM

## 2025-08-19 DIAGNOSIS — F41.9 ANXIETY: Primary | ICD-10-CM

## 2025-08-19 DIAGNOSIS — L84 FOOT CALLUS: ICD-10-CM

## 2025-08-19 DIAGNOSIS — R41.3 MEMORY LOSS: ICD-10-CM

## 2025-08-19 PROCEDURE — 1159F MED LIST DOCD IN RCRD: CPT | Performed by: STUDENT IN AN ORGANIZED HEALTH CARE EDUCATION/TRAINING PROGRAM

## 2025-08-19 PROCEDURE — 99214 OFFICE O/P EST MOD 30 MIN: CPT | Performed by: STUDENT IN AN ORGANIZED HEALTH CARE EDUCATION/TRAINING PROGRAM

## 2025-08-19 PROCEDURE — 1160F RVW MEDS BY RX/DR IN RCRD: CPT | Performed by: STUDENT IN AN ORGANIZED HEALTH CARE EDUCATION/TRAINING PROGRAM

## 2025-08-19 PROCEDURE — 1126F AMNT PAIN NOTED NONE PRSNT: CPT | Performed by: STUDENT IN AN ORGANIZED HEALTH CARE EDUCATION/TRAINING PROGRAM

## 2025-08-19 RX ORDER — PROPRANOLOL HYDROCHLORIDE 10 MG/1
10 TABLET ORAL 2 TIMES DAILY PRN
Qty: 30 TABLET | Refills: 1 | Status: SHIPPED | OUTPATIENT
Start: 2025-08-19